# Patient Record
Sex: FEMALE | Race: WHITE | NOT HISPANIC OR LATINO | Employment: OTHER | URBAN - METROPOLITAN AREA
[De-identification: names, ages, dates, MRNs, and addresses within clinical notes are randomized per-mention and may not be internally consistent; named-entity substitution may affect disease eponyms.]

---

## 2023-04-24 DIAGNOSIS — E11.65 TYPE 2 DIABETES MELLITUS WITH HYPERGLYCEMIA, WITHOUT LONG-TERM CURRENT USE OF INSULIN (MULTI): Primary | ICD-10-CM

## 2023-04-24 RX ORDER — TIRZEPATIDE 5 MG/.5ML
2 INJECTION, SOLUTION SUBCUTANEOUS
COMMUNITY
Start: 2023-01-12 | End: 2023-04-24 | Stop reason: SDUPTHER

## 2023-04-24 RX ORDER — TIRZEPATIDE 5 MG/.5ML
0.5 INJECTION, SOLUTION SUBCUTANEOUS
Qty: 2 ML | Refills: 1 | Status: SHIPPED | OUTPATIENT
Start: 2023-04-24 | End: 2023-08-18 | Stop reason: ALTCHOICE

## 2023-05-09 LAB
ANION GAP IN SER/PLAS: 13 MMOL/L (ref 10–20)
CALCIUM (MG/DL) IN SER/PLAS: 9 MG/DL (ref 8.6–10.3)
CARBON DIOXIDE, TOTAL (MMOL/L) IN SER/PLAS: 27 MMOL/L (ref 21–32)
CHLORIDE (MMOL/L) IN SER/PLAS: 102 MMOL/L (ref 98–107)
CREATININE (MG/DL) IN SER/PLAS: 1.17 MG/DL (ref 0.5–1.05)
ESTIMATED AVERAGE GLUCOSE FOR HBA1C: 123 MG/DL
GFR FEMALE: 52 ML/MIN/1.73M2
GLUCOSE (MG/DL) IN SER/PLAS: 143 MG/DL (ref 74–99)
HEMOGLOBIN A1C/HEMOGLOBIN TOTAL IN BLOOD: 5.9 %
POTASSIUM (MMOL/L) IN SER/PLAS: 4.2 MMOL/L (ref 3.5–5.3)
SODIUM (MMOL/L) IN SER/PLAS: 138 MMOL/L (ref 136–145)
THYROTROPIN (MIU/L) IN SER/PLAS BY DETECTION LIMIT <= 0.05 MIU/L: 5.33 MIU/L (ref 0.44–3.98)
THYROXINE (T4) FREE (NG/DL) IN SER/PLAS: 0.74 NG/DL (ref 0.61–1.12)
UREA NITROGEN (MG/DL) IN SER/PLAS: 12 MG/DL (ref 6–23)

## 2023-05-10 PROBLEM — L65.9 HAIR LOSS: Status: ACTIVE | Noted: 2023-05-10

## 2023-05-10 PROBLEM — M54.2 CERVICAL PAIN: Status: ACTIVE | Noted: 2023-05-10

## 2023-05-10 PROBLEM — N17.9 AKI (ACUTE KIDNEY INJURY) (CMS-HCC): Status: ACTIVE | Noted: 2023-05-10

## 2023-05-10 PROBLEM — D72.819 LEUKOPENIA: Status: ACTIVE | Noted: 2023-05-10

## 2023-05-10 PROBLEM — F32.A DEPRESSION: Status: ACTIVE | Noted: 2023-05-10

## 2023-05-10 PROBLEM — M25.511 PAIN IN RIGHT SHOULDER: Status: ACTIVE | Noted: 2023-05-10

## 2023-05-10 PROBLEM — R80.9 PROTEINURIA: Status: ACTIVE | Noted: 2023-05-10

## 2023-05-10 PROBLEM — E66.812 CLASS 2 SEVERE OBESITY WITH BODY MASS INDEX (BMI) OF 35 TO 39.9 WITH SERIOUS COMORBIDITY: Status: ACTIVE | Noted: 2023-05-10

## 2023-05-10 PROBLEM — M25.559 ARTHRALGIA OF HIP: Status: ACTIVE | Noted: 2023-05-10

## 2023-05-10 PROBLEM — S16.1XXA CERVICAL STRAIN, INITIAL ENCOUNTER: Status: ACTIVE | Noted: 2023-05-10

## 2023-05-10 PROBLEM — R68.89 MULTIPLE SOMATIC COMPLAINTS: Status: ACTIVE | Noted: 2023-05-10

## 2023-05-10 PROBLEM — Z11.52 ENCOUNTER FOR SCREENING LABORATORY TESTING FOR COVID-19 VIRUS: Status: ACTIVE | Noted: 2023-05-10

## 2023-05-10 PROBLEM — E11.9 DIABETES TYPE 2, CONTROLLED (MULTI): Status: ACTIVE | Noted: 2023-05-10

## 2023-05-10 PROBLEM — N18.31 STAGE 3A CHRONIC KIDNEY DISEASE (MULTI): Status: ACTIVE | Noted: 2023-05-10

## 2023-05-10 PROBLEM — M26.629 TMJ PAIN DYSFUNCTION SYNDROME: Status: ACTIVE | Noted: 2023-05-10

## 2023-05-10 PROBLEM — I10 BENIGN ESSENTIAL HYPERTENSION: Status: ACTIVE | Noted: 2023-05-10

## 2023-05-10 PROBLEM — F41.9 ANXIETY DUE TO INVASIVE PROCEDURE: Status: ACTIVE | Noted: 2023-05-10

## 2023-05-10 PROBLEM — R41.89 BRAIN FOG: Status: ACTIVE | Noted: 2023-05-10

## 2023-05-10 PROBLEM — B35.4 TINEA CORPORIS: Status: ACTIVE | Noted: 2023-05-10

## 2023-05-10 PROBLEM — E66.01 CLASS 2 SEVERE OBESITY WITH BODY MASS INDEX (BMI) OF 35 TO 39.9 WITH SERIOUS COMORBIDITY (MULTI): Status: ACTIVE | Noted: 2023-05-10

## 2023-05-10 PROBLEM — M75.101 ROTATOR CUFF SYNDROME OF RIGHT SHOULDER: Status: ACTIVE | Noted: 2023-05-10

## 2023-05-10 PROBLEM — R79.89 ABNORMAL TSH: Status: ACTIVE | Noted: 2023-05-10

## 2023-05-10 PROBLEM — K76.0 FATTY LIVER: Status: ACTIVE | Noted: 2023-05-10

## 2023-05-10 PROBLEM — E53.8 B12 DEFICIENCY: Status: ACTIVE | Noted: 2023-05-10

## 2023-05-10 PROBLEM — E78.5 HYPERLIPIDEMIA, MILD: Status: ACTIVE | Noted: 2023-05-10

## 2023-05-11 ENCOUNTER — OFFICE VISIT (OUTPATIENT)
Dept: PRIMARY CARE | Facility: CLINIC | Age: 63
End: 2023-05-11
Payer: COMMERCIAL

## 2023-05-11 VITALS
HEIGHT: 65 IN | SYSTOLIC BLOOD PRESSURE: 130 MMHG | OXYGEN SATURATION: 95 % | BODY MASS INDEX: 42.35 KG/M2 | WEIGHT: 254.2 LBS | DIASTOLIC BLOOD PRESSURE: 88 MMHG | HEART RATE: 76 BPM | TEMPERATURE: 97.2 F | RESPIRATION RATE: 16 BRPM

## 2023-05-11 DIAGNOSIS — F32.5 MAJOR DEPRESSIVE DISORDER WITH SINGLE EPISODE, IN FULL REMISSION (CMS-HCC): ICD-10-CM

## 2023-05-11 DIAGNOSIS — E66.01 MORBID OBESITY (MULTI): ICD-10-CM

## 2023-05-11 DIAGNOSIS — E11.65 TYPE 2 DIABETES MELLITUS WITH HYPERGLYCEMIA, WITHOUT LONG-TERM CURRENT USE OF INSULIN (MULTI): ICD-10-CM

## 2023-05-11 DIAGNOSIS — E55.9 VITAMIN D DEFICIENCY: ICD-10-CM

## 2023-05-11 DIAGNOSIS — E11.65 CONTROLLED TYPE 2 DIABETES MELLITUS WITH HYPERGLYCEMIA, WITHOUT LONG-TERM CURRENT USE OF INSULIN (MULTI): Primary | ICD-10-CM

## 2023-05-11 DIAGNOSIS — E53.8 B12 DEFICIENCY: ICD-10-CM

## 2023-05-11 PROBLEM — R68.89 MULTIPLE SOMATIC COMPLAINTS: Status: RESOLVED | Noted: 2023-05-10 | Resolved: 2023-05-11

## 2023-05-11 PROBLEM — F41.9 ANXIETY DUE TO INVASIVE PROCEDURE: Status: RESOLVED | Noted: 2023-05-10 | Resolved: 2023-05-11

## 2023-05-11 PROBLEM — Z11.52 ENCOUNTER FOR SCREENING LABORATORY TESTING FOR COVID-19 VIRUS: Status: RESOLVED | Noted: 2023-05-10 | Resolved: 2023-05-11

## 2023-05-11 PROCEDURE — 99214 OFFICE O/P EST MOD 30 MIN: CPT | Performed by: INTERNAL MEDICINE

## 2023-05-11 PROCEDURE — 3044F HG A1C LEVEL LT 7.0%: CPT | Performed by: INTERNAL MEDICINE

## 2023-05-11 PROCEDURE — 3075F SYST BP GE 130 - 139MM HG: CPT | Performed by: INTERNAL MEDICINE

## 2023-05-11 PROCEDURE — 1036F TOBACCO NON-USER: CPT | Performed by: INTERNAL MEDICINE

## 2023-05-11 PROCEDURE — 3079F DIAST BP 80-89 MM HG: CPT | Performed by: INTERNAL MEDICINE

## 2023-05-11 RX ORDER — TIRZEPATIDE 5 MG/.5ML
0.5 INJECTION, SOLUTION SUBCUTANEOUS
Qty: 4 ML | Refills: 1 | Status: CANCELLED | OUTPATIENT
Start: 2023-05-11

## 2023-05-11 RX ORDER — CITALOPRAM HYDROBROMIDE 20 MG
30 TABLET ORAL DAILY
COMMUNITY

## 2023-05-11 RX ORDER — IBUPROFEN 200 MG
CAPSULE ORAL
COMMUNITY
Start: 2020-01-07

## 2023-05-11 RX ORDER — ROSUVASTATIN CALCIUM 10 MG/1
1 TABLET, COATED ORAL NIGHTLY
COMMUNITY
Start: 2020-06-10 | End: 2023-06-14 | Stop reason: SINTOL

## 2023-05-11 RX ORDER — TRAZODONE HYDROCHLORIDE 50 MG/1
50 TABLET ORAL NIGHTLY PRN
COMMUNITY
Start: 2023-01-12

## 2023-05-11 RX ORDER — TIRZEPATIDE 7.5 MG/.5ML
7.5 INJECTION, SOLUTION SUBCUTANEOUS
Qty: 6 ML | Refills: 1 | Status: SHIPPED | OUTPATIENT
Start: 2023-05-11 | End: 2023-08-18 | Stop reason: SDUPTHER

## 2023-05-11 RX ORDER — METHOCARBAMOL 500 MG/1
500 TABLET, FILM COATED ORAL 3 TIMES DAILY PRN
COMMUNITY
Start: 2023-01-13 | End: 2023-05-11 | Stop reason: ALTCHOICE

## 2023-05-11 RX ORDER — ACETAMINOPHEN, DIPHENHYDRAMINE HCL, PHENYLEPHRINE HCL 325; 25; 5 MG/1; MG/1; MG/1
5000 TABLET ORAL 2 TIMES WEEKLY
COMMUNITY
Start: 2022-10-13 | End: 2023-12-19 | Stop reason: ALTCHOICE

## 2023-05-11 RX ORDER — MELATONIN 10 MG
1 CAPSULE ORAL NIGHTLY
COMMUNITY
Start: 2022-07-07

## 2023-05-11 RX ORDER — BETAMETHASONE DIPROPIONATE 0.5 MG/G
CREAM TOPICAL
COMMUNITY
Start: 2023-04-05

## 2023-05-11 ASSESSMENT — ENCOUNTER SYMPTOMS
CHILLS: 0
SHORTNESS OF BREATH: 0
PALPITATIONS: 0
VOMITING: 0
WHEEZING: 0
HEADACHES: 0
BLOOD IN STOOL: 0
DYSPHORIC MOOD: 0
ABDOMINAL PAIN: 0
FREQUENCY: 0
DIARRHEA: 0
RHINORRHEA: 0
UNEXPECTED WEIGHT CHANGE: 0
POLYDIPSIA: 0
EYE PAIN: 0
POLYPHAGIA: 0
BACK PAIN: 1
ARTHRALGIAS: 1
DIZZINESS: 0
WOUND: 0
FEVER: 0
MYALGIAS: 0
CONSTIPATION: 0
NAUSEA: 0
COUGH: 0
NERVOUS/ANXIOUS: 0
CHEST TIGHTNESS: 0
DYSURIA: 0
SORE THROAT: 0
HEMATURIA: 0

## 2023-05-11 NOTE — PROGRESS NOTES
"Subjective   Patient ID: Darlin Ryan is a 63 y.o. female who presents for Follow-up (4 month follow up//Pt wants to know what she can take when needed that is stronger then tylenol but not bad foe her kidneys.).    HPI      Her for followup  Has been gaining weight   States diet is not the best  More sugar and eats a lot of sweets and candy  Struggles with her chronic back pain. Had injection in her back in 2016 and has not done much for it  Doing PT for her shoulder. Saw Dr. Fowler and plan for followup in 3 months.  She states has a pool to swim in but states hurts her back when swimming    She wonders what she can do besides tylenol since she cannot do ibuprofen, advil or aleve    She did her labs prior to her visit    Review of Systems   Constitutional:  Negative for chills, fever and unexpected weight change.   HENT:  Negative for congestion, hearing loss, rhinorrhea and sore throat.    Eyes:  Negative for pain and visual disturbance.   Respiratory:  Negative for cough, chest tightness, shortness of breath and wheezing.    Cardiovascular:  Negative for chest pain and palpitations.   Gastrointestinal:  Negative for abdominal pain, blood in stool, constipation, diarrhea, nausea and vomiting.   Endocrine: Negative for cold intolerance, heat intolerance, polydipsia and polyphagia.   Genitourinary:  Negative for dysuria, frequency and hematuria.   Musculoskeletal:  Positive for arthralgias and back pain. Negative for myalgias.   Skin:  Negative for rash and wound.   Neurological:  Negative for dizziness, syncope and headaches.   Psychiatric/Behavioral:  Negative for dysphoric mood. The patient is not nervous/anxious.        Objective   /88   Pulse 76   Temp 36.2 °C (97.2 °F)   Resp 16   Ht 1.651 m (5' 5\")   Wt 115 kg (254 lb 3.2 oz)   SpO2 95%   BMI 42.30 kg/m²     Physical Exam  Constitutional:       Appearance: Normal appearance.   Cardiovascular:      Rate and Rhythm: Normal rate and regular " rhythm.      Heart sounds: Normal heart sounds. No murmur heard.     No gallop.   Pulmonary:      Effort: Pulmonary effort is normal. No respiratory distress.      Breath sounds: Normal breath sounds.   Musculoskeletal:      Left lower leg: No edema.   Neurological:      Mental Status: She is alert.         Assessment/Plan   Problem List Items Addressed This Visit          Endocrine/Metabolic    B12 deficiency    Relevant Orders    Vitamin B12    Morbid obesity (CMS/[facility])    Diabetes type 2, controlled (CMS/[facility]) - Primary    Relevant Medications    tirzepatide (Mounjaro) 7.5 mg/0.5 mL pen injector       Other    Depression     Other Visit Diagnoses       Type 2 diabetes mellitus with hyperglycemia, without long-term current use of insulin (CMS/[facility])        Relevant Orders    CBC    Comprehensive Metabolic Panel    Lipid Panel    Albumin , Urine Random    Hemoglobin A1C    Vitamin D deficiency        Relevant Orders    Vitamin D 25-Hydroxy,Total (for eval of Vitamin D levels)             Obesity:  -topamax did not help  -Phentermine helped. Down 20 pounds.   --Increase Mounjaro  -did not have an affect with ozempic  -work on diet. Discussed strategies    TMJ issues:     New onset type 2 diabetes: a1c: 11.3--> 6.3--> 6.4--> 5.6-->6.1--> 5.3--> 5.4--> 5/8  stop metformin.   -increase mounjaro  -+ urine microalbumin (1/20)--> now normal 10/22    Migraines from metformin: off topamax    Hypertension: resolved off of meds  -Lisinopril caused migraines    Proteinuria: resolved    Leukopenia: resolved    Abnormal TSH: TSH barely up and free t4 ok    Hyperlipidemia: controlled on crestor.   -lipids WNL 10/22    Shoulder pain and neck pain: seeing ortho  -has a tear  1    Known Fatty liver: get Dr. Hunt records  -weight loss  -liver enzymes normalized    Depression  -follows with Dr. Hilda Lockwood ([place])--has not seen  -controlled on celexa and desyrel and prn vistaril    Chronic back pain: herniated disc, DDD, and  sciatica  -she has had injections  -can use salon past patches, voltaren gel  -advised we can redo imaging and potentially do xray and MRI in future  -try conservative therapy and try pool exercises. Discussed aquatic therapy-declined for now    Hx of lichen planus: follows with dermatology--Dr. Drake    Venous insufficiency: compression stockings, elevate legs    B12 deficiency: cont b12 pills     4 months f/u with labs before    Health Maintenance:   -pap smear: 10/20- neg, repeat 5 years  -Mammogram: refused mammo. Interested in MRI-ordered  -Colonoscopy: 10/26/22- cologuard neg  -Lung Cancer Screening: quit 2004  -Vaccinations: Pneumovax UTD. advised shingrix and tdap. Flu UTD  -DEXA: at 64

## 2023-06-02 ENCOUNTER — TELEPHONE (OUTPATIENT)
Dept: PRIMARY CARE | Facility: CLINIC | Age: 63
End: 2023-06-02
Payer: COMMERCIAL

## 2023-06-02 NOTE — TELEPHONE ENCOUNTER
Pt called because she stopped takin the crestor about two weeks ago.    She is wanting to know if there is something else she could try that would not effect her muscles so much.    Please advise.

## 2023-06-14 ENCOUNTER — OFFICE VISIT (OUTPATIENT)
Dept: PRIMARY CARE | Facility: CLINIC | Age: 63
End: 2023-06-14
Payer: COMMERCIAL

## 2023-06-14 VITALS
HEART RATE: 72 BPM | DIASTOLIC BLOOD PRESSURE: 63 MMHG | SYSTOLIC BLOOD PRESSURE: 126 MMHG | TEMPERATURE: 96.9 F | OXYGEN SATURATION: 98 %

## 2023-06-14 DIAGNOSIS — M48.061 SPINAL STENOSIS OF LUMBAR REGION, UNSPECIFIED WHETHER NEUROGENIC CLAUDICATION PRESENT: Primary | ICD-10-CM

## 2023-06-14 DIAGNOSIS — E78.5 HYPERLIPIDEMIA, MILD: ICD-10-CM

## 2023-06-14 DIAGNOSIS — M54.41 CHRONIC BILATERAL LOW BACK PAIN WITH RIGHT-SIDED SCIATICA: ICD-10-CM

## 2023-06-14 DIAGNOSIS — G89.29 CHRONIC BILATERAL LOW BACK PAIN WITH RIGHT-SIDED SCIATICA: ICD-10-CM

## 2023-06-14 PROCEDURE — 99214 OFFICE O/P EST MOD 30 MIN: CPT | Performed by: NURSE PRACTITIONER

## 2023-06-14 PROCEDURE — 3074F SYST BP LT 130 MM HG: CPT | Performed by: NURSE PRACTITIONER

## 2023-06-14 PROCEDURE — 1036F TOBACCO NON-USER: CPT | Performed by: NURSE PRACTITIONER

## 2023-06-14 PROCEDURE — 3044F HG A1C LEVEL LT 7.0%: CPT | Performed by: NURSE PRACTITIONER

## 2023-06-14 PROCEDURE — 3078F DIAST BP <80 MM HG: CPT | Performed by: NURSE PRACTITIONER

## 2023-06-14 RX ORDER — PRAVASTATIN SODIUM 40 MG/1
40 TABLET ORAL DAILY
Qty: 90 TABLET | Refills: 0 | Status: SHIPPED | OUTPATIENT
Start: 2023-06-14 | End: 2023-08-18 | Stop reason: ALTCHOICE

## 2023-06-14 RX ORDER — METHYLPREDNISOLONE 4 MG/1
TABLET ORAL
Qty: 21 TABLET | Refills: 0 | Status: SHIPPED | OUTPATIENT
Start: 2023-06-14 | End: 2023-06-21

## 2023-06-14 ASSESSMENT — PATIENT HEALTH QUESTIONNAIRE - PHQ9
SUM OF ALL RESPONSES TO PHQ9 QUESTIONS 1 AND 2: 0
1. LITTLE INTEREST OR PLEASURE IN DOING THINGS: NOT AT ALL
2. FEELING DOWN, DEPRESSED OR HOPELESS: NOT AT ALL

## 2023-06-14 NOTE — ASSESSMENT & PLAN NOTE
She would like to see spinal surgery for option regarding options for repair.  Has done therapy in the past.  Medrol pack given for acute flare up as she is leaving for vacation in a few days.  Discussed pain management to do another injection

## 2023-06-14 NOTE — PROGRESS NOTES
Subjective   Patient ID: Darlin Ryan is a 63 y.o. female who presents for discuss alternative for crestor.    Presents today with muscle pain which started on her lower legs.  Has been on crestor for about 4-5 years. Attributes this pain in the muscles to her crestor.  Pain in her legs has been present for many months.    Having issues with sciatica.  Did PT last fall.  She has herniated discs in the lower back along with arthritis. Had injections in the past.         Review of Systems  otherwise negative aside from what was mentioned above in HPI.    Objective   /63   Pulse 72   Temp 36.1 °C (96.9 °F)   SpO2 98%     Physical Exam  Constitutional:       Appearance: Normal appearance.   Cardiovascular:      Rate and Rhythm: Normal rate and regular rhythm.   Pulmonary:      Effort: Pulmonary effort is normal.      Breath sounds: Normal breath sounds.   Abdominal:      General: Abdomen is flat.   Musculoskeletal:         General: Normal range of motion.   Neurological:      General: No focal deficit present.      Mental Status: She is alert and oriented to person, place, and time. Mental status is at baseline.   Psychiatric:         Mood and Affect: Mood normal.         Behavior: Behavior normal.         Thought Content: Thought content normal.         Assessment/Plan   Problem List Items Addressed This Visit          Nervous    Lumbar spinal stenosis - Primary    Relevant Medications    methylPREDNISolone (Medrol Dospak) 4 mg tablets    Other Relevant Orders    Referral to Spine Surgery       Other    Hyperlipidemia, mild (Chronic)     Muscle aches on crestor  Switch to pravastatin which has lower side effects of myalgias. If persistent may send to cardiology to discuss injectables.         Relevant Medications    pravastatin (Pravachol) 40 mg tablet    Chronic low back pain with right-sided sciatica (Chronic)     She would like to see spinal surgery for option regarding options for repair.  Has done therapy  in the past.  Medrol pack given for acute flare up as she is leaving for vacation in a few days.  Discussed pain management to do another injection         Relevant Medications    methylPREDNISolone (Medrol Dospak) 4 mg tablets    Other Relevant Orders    Referral to Spine Surgery

## 2023-06-14 NOTE — ASSESSMENT & PLAN NOTE
Muscle aches on crestor  Switch to pravastatin which has lower side effects of myalgias. If persistent may send to cardiology to discuss injectables.

## 2023-06-27 ENCOUNTER — TELEPHONE (OUTPATIENT)
Dept: PRIMARY CARE | Facility: CLINIC | Age: 63
End: 2023-06-27
Payer: COMMERCIAL

## 2023-06-27 NOTE — TELEPHONE ENCOUNTER
Pt called to get message to Destiny    Pt stated she saw her for back pain, and she stated she couldn't reach Dr. Bhakta, orthopedic office. Didn't know who to contact.

## 2023-06-28 NOTE — TELEPHONE ENCOUNTER
Done. I called pt asked if Dr. Bhakta office had gotten back to her, she stated they hadn't. So I called Dr. Bhakta office, got central scheduling dept, who transferred me to her office, spoke with  and they gave me a phone number for her to call [phone]. But while I was on hold I researched and found referral that Destiny put in system and was able to scheduled appt with Dr. Bhakta on 7/6/23 @ 8:30 am thru [facility].  I called pt back and informed her that I booked appointment for her. Also mentioned she has an upcoming appointment with Dr. Fowler to review MRI. She stated she sees him for her shoulder.

## 2023-07-10 ENCOUNTER — TELEPHONE (OUTPATIENT)
Dept: PRIMARY CARE | Facility: CLINIC | Age: 63
End: 2023-07-10
Payer: COMMERCIAL

## 2023-07-10 NOTE — TELEPHONE ENCOUNTER
Pt was in last month and seen Destiny - Destiny had changed cholesterol medication to pravastatin due to leg pain - However pt states this medication seems to have same effect. Should she try something else? Also Dr Bhakta had prescribed generic robaxin, pt wants to confirm this is safe to take with her kidney diease - please call and advise.

## 2023-08-14 ASSESSMENT — ENCOUNTER SYMPTOMS
POLYPHAGIA: 0
SEIZURES: 0
NERVOUS/ANXIOUS: 0
WEIGHT LOSS: 0
TREMORS: 0
SWEATS: 0
CONFUSION: 0
POLYDIPSIA: 0
SPEECH DIFFICULTY: 0
VISUAL CHANGE: 0
HUNGER: 0
FATIGUE: 0
WEAKNESS: 0
BLACKOUTS: 0
DIZZINESS: 0
BLURRED VISION: 0
HEADACHES: 0

## 2023-08-15 ENCOUNTER — LAB (OUTPATIENT)
Dept: LAB | Facility: LAB | Age: 63
End: 2023-08-15
Payer: COMMERCIAL

## 2023-08-15 DIAGNOSIS — E55.9 VITAMIN D DEFICIENCY: ICD-10-CM

## 2023-08-15 DIAGNOSIS — E11.65 TYPE 2 DIABETES MELLITUS WITH HYPERGLYCEMIA, WITHOUT LONG-TERM CURRENT USE OF INSULIN (MULTI): ICD-10-CM

## 2023-08-15 DIAGNOSIS — E53.8 B12 DEFICIENCY: ICD-10-CM

## 2023-08-15 LAB
ALANINE AMINOTRANSFERASE (SGPT) (U/L) IN SER/PLAS: 13 U/L (ref 7–45)
ALBUMIN (G/DL) IN SER/PLAS: 3.9 G/DL (ref 3.4–5)
ALBUMIN (MG/L) IN URINE: 21.2 MG/L
ALBUMIN/CREATININE (UG/MG) IN URINE: 20.6 UG/MG CRT (ref 0–30)
ALKALINE PHOSPHATASE (U/L) IN SER/PLAS: 54 U/L (ref 33–136)
ANION GAP IN SER/PLAS: 13 MMOL/L (ref 10–20)
ASPARTATE AMINOTRANSFERASE (SGOT) (U/L) IN SER/PLAS: 17 U/L (ref 9–39)
BILIRUBIN TOTAL (MG/DL) IN SER/PLAS: 0.8 MG/DL (ref 0–1.2)
CALCIDIOL (25 OH VITAMIN D3) (NG/ML) IN SER/PLAS: 24 NG/ML
CALCIUM (MG/DL) IN SER/PLAS: 9.3 MG/DL (ref 8.6–10.3)
CARBON DIOXIDE, TOTAL (MMOL/L) IN SER/PLAS: 27 MMOL/L (ref 21–32)
CHLORIDE (MMOL/L) IN SER/PLAS: 103 MMOL/L (ref 98–107)
CHOLESTEROL (MG/DL) IN SER/PLAS: 221 MG/DL (ref 0–199)
CHOLESTEROL IN HDL (MG/DL) IN SER/PLAS: 35.5 MG/DL
CHOLESTEROL/HDL RATIO: 6.2
COBALAMIN (VITAMIN B12) (PG/ML) IN SER/PLAS: 532 PG/ML (ref 211–911)
CREATININE (MG/DL) IN SER/PLAS: 1.14 MG/DL (ref 0.5–1.05)
CREATININE (MG/DL) IN URINE: 103 MG/DL (ref 20–320)
ERYTHROCYTE DISTRIBUTION WIDTH (RATIO) BY AUTOMATED COUNT: 13 % (ref 11.5–14.5)
ERYTHROCYTE MEAN CORPUSCULAR HEMOGLOBIN CONCENTRATION (G/DL) BY AUTOMATED: 31.9 G/DL (ref 32–36)
ERYTHROCYTE MEAN CORPUSCULAR VOLUME (FL) BY AUTOMATED COUNT: 91 FL (ref 80–100)
ERYTHROCYTES (10*6/UL) IN BLOOD BY AUTOMATED COUNT: 4.66 X10E12/L (ref 4–5.2)
ESTIMATED AVERAGE GLUCOSE FOR HBA1C: 128 MG/DL
GFR FEMALE: 54 ML/MIN/1.73M2
GLUCOSE (MG/DL) IN SER/PLAS: 126 MG/DL (ref 74–99)
HEMATOCRIT (%) IN BLOOD BY AUTOMATED COUNT: 42.6 % (ref 36–46)
HEMOGLOBIN (G/DL) IN BLOOD: 13.6 G/DL (ref 12–16)
HEMOGLOBIN A1C/HEMOGLOBIN TOTAL IN BLOOD: 6.1 %
LDL: 140 MG/DL (ref 0–99)
LEUKOCYTES (10*3/UL) IN BLOOD BY AUTOMATED COUNT: 4 X10E9/L (ref 4.4–11.3)
NON HDL CHOLESTEROL: 186 MG/DL
PLATELETS (10*3/UL) IN BLOOD AUTOMATED COUNT: 174 X10E9/L (ref 150–450)
POTASSIUM (MMOL/L) IN SER/PLAS: 4.2 MMOL/L (ref 3.5–5.3)
PROTEIN TOTAL: 6.7 G/DL (ref 6.4–8.2)
SODIUM (MMOL/L) IN SER/PLAS: 139 MMOL/L (ref 136–145)
TRIGLYCERIDE (MG/DL) IN SER/PLAS: 229 MG/DL (ref 0–149)
UREA NITROGEN (MG/DL) IN SER/PLAS: 14 MG/DL (ref 6–23)
VLDL: 46 MG/DL (ref 0–40)

## 2023-08-15 PROCEDURE — 85027 COMPLETE CBC AUTOMATED: CPT

## 2023-08-15 PROCEDURE — 82607 VITAMIN B-12: CPT

## 2023-08-15 PROCEDURE — 82043 UR ALBUMIN QUANTITATIVE: CPT

## 2023-08-15 PROCEDURE — 36415 COLL VENOUS BLD VENIPUNCTURE: CPT

## 2023-08-15 PROCEDURE — 80061 LIPID PANEL: CPT

## 2023-08-15 PROCEDURE — 82570 ASSAY OF URINE CREATININE: CPT

## 2023-08-15 PROCEDURE — 82306 VITAMIN D 25 HYDROXY: CPT

## 2023-08-15 PROCEDURE — 80053 COMPREHEN METABOLIC PANEL: CPT

## 2023-08-15 PROCEDURE — 83036 HEMOGLOBIN GLYCOSYLATED A1C: CPT

## 2023-08-18 ENCOUNTER — OFFICE VISIT (OUTPATIENT)
Dept: PRIMARY CARE | Facility: CLINIC | Age: 63
End: 2023-08-18
Payer: COMMERCIAL

## 2023-08-18 VITALS
OXYGEN SATURATION: 97 % | BODY MASS INDEX: 42.33 KG/M2 | TEMPERATURE: 97.2 F | DIASTOLIC BLOOD PRESSURE: 84 MMHG | WEIGHT: 254.4 LBS | SYSTOLIC BLOOD PRESSURE: 122 MMHG | HEART RATE: 65 BPM | RESPIRATION RATE: 16 BRPM

## 2023-08-18 DIAGNOSIS — I10 BENIGN ESSENTIAL HYPERTENSION: Primary | ICD-10-CM

## 2023-08-18 DIAGNOSIS — E78.5 HYPERLIPIDEMIA, MILD: Chronic | ICD-10-CM

## 2023-08-18 DIAGNOSIS — E11.65 CONTROLLED TYPE 2 DIABETES MELLITUS WITH HYPERGLYCEMIA, WITHOUT LONG-TERM CURRENT USE OF INSULIN (MULTI): ICD-10-CM

## 2023-08-18 DIAGNOSIS — M48.061 SPINAL STENOSIS OF LUMBAR REGION, UNSPECIFIED WHETHER NEUROGENIC CLAUDICATION PRESENT: ICD-10-CM

## 2023-08-18 DIAGNOSIS — N18.31 STAGE 3A CHRONIC KIDNEY DISEASE (MULTI): ICD-10-CM

## 2023-08-18 PROBLEM — R80.9 PROTEINURIA: Status: RESOLVED | Noted: 2023-05-10 | Resolved: 2023-08-18

## 2023-08-18 PROBLEM — N17.9 AKI (ACUTE KIDNEY INJURY) (CMS-HCC): Status: RESOLVED | Noted: 2023-05-10 | Resolved: 2023-08-18

## 2023-08-18 PROCEDURE — 99214 OFFICE O/P EST MOD 30 MIN: CPT | Performed by: INTERNAL MEDICINE

## 2023-08-18 PROCEDURE — 1036F TOBACCO NON-USER: CPT | Performed by: INTERNAL MEDICINE

## 2023-08-18 PROCEDURE — 3079F DIAST BP 80-89 MM HG: CPT | Performed by: INTERNAL MEDICINE

## 2023-08-18 PROCEDURE — 3074F SYST BP LT 130 MM HG: CPT | Performed by: INTERNAL MEDICINE

## 2023-08-18 PROCEDURE — 3044F HG A1C LEVEL LT 7.0%: CPT | Performed by: INTERNAL MEDICINE

## 2023-08-18 RX ORDER — TIRZEPATIDE 7.5 MG/.5ML
7.5 INJECTION, SOLUTION SUBCUTANEOUS
Qty: 6 ML | Refills: 1 | Status: SHIPPED | OUTPATIENT
Start: 2023-08-18 | End: 2023-08-25 | Stop reason: SDUPTHER

## 2023-08-18 ASSESSMENT — ENCOUNTER SYMPTOMS
WEAKNESS: 0
POLYDIPSIA: 0
SWEATS: 0
ABDOMINAL PAIN: 0
CONSTIPATION: 0
HUNGER: 0
DIARRHEA: 0
BLURRED VISION: 0
DIZZINESS: 0
BACK PAIN: 1
ARTHRALGIAS: 1
VISUAL CHANGE: 0
POLYPHAGIA: 0
WEIGHT LOSS: 0
FATIGUE: 0
HEADACHES: 0
NERVOUS/ANXIOUS: 0
SEIZURES: 0
SPEECH DIFFICULTY: 0
BLACKOUTS: 0
CONFUSION: 0
TREMORS: 0
UNEXPECTED WEIGHT CHANGE: 1

## 2023-08-18 ASSESSMENT — PATIENT HEALTH QUESTIONNAIRE - PHQ9
1. LITTLE INTEREST OR PLEASURE IN DOING THINGS: NOT AT ALL
2. FEELING DOWN, DEPRESSED OR HOPELESS: NOT AT ALL
SUM OF ALL RESPONSES TO PHQ9 QUESTIONS 1 AND 2: 0

## 2023-08-18 NOTE — PROGRESS NOTES
Subjective   Patient ID: Darlin Ryan is a 63 y.o. female who presents for Follow-up (4 month follow up).    Diabetes  She has type 2 diabetes mellitus. No MedicAlert identification noted. The initial diagnosis of diabetes was made 3 years ago. Pertinent negatives for hypoglycemia include no confusion, dizziness, headaches, hunger, mood changes, nervousness/anxiousness, pallor, seizures, sleepiness, speech difficulty, sweats or tremors. Pertinent negatives for diabetes include no blurred vision, no chest pain, no fatigue, no foot paresthesias, no foot ulcerations, no polydipsia, no polyphagia, no polyuria, no visual change, no weakness and no weight loss. Pertinent negatives for hypoglycemia complications include no blackouts, no hospitalization, no nocturnal hypoglycemia, no required assistance and no required glucagon injection. Diabetic complications include nephropathy and retinopathy. Pertinent negatives for diabetic complications include no CVA, heart disease, impotence, peripheral neuropathy or PVD. Risk factors for coronary artery disease include dyslipidemia, obesity and stress. Current diabetic treatment includes oral agent (monotherapy). She is compliant with treatment most of the time. Her weight is stable. She is following a low fat/cholesterol diet. Meal planning includes avoidance of concentrated sweets. She has not had a previous visit with a dietitian. She rarely participates in exercise. She monitors blood glucose at home 1-2 x per week. Blood glucose monitoring compliance is inadequate. She does not see a podiatrist.Eye exam is current.        Here for followup  States will need PA startin 8/23 for mounjaro    She has been doing PT for sciatica and back. She may be getting injection soon for this. Has had severe muscle aches in legs. She stopped crestor and helped some. Was started on pravastatin and worsened it again and stopped. Has been off for a few weeks. Went to PT and today was the very  first day muscle aches in her legs felt better after PT    She gained weight. She states she was not able to do much  due to the severe pain. It made her feel very weak    She has 2 more sessions of PT and will want to do more    Review of Systems   Constitutional:  Positive for unexpected weight change. Negative for fatigue and weight loss.   Eyes:  Negative for blurred vision.   Cardiovascular:  Negative for chest pain.   Gastrointestinal:  Negative for abdominal pain, constipation and diarrhea.   Endocrine: Negative for polydipsia, polyphagia and polyuria.   Genitourinary:  Negative for impotence.   Musculoskeletal:  Positive for arthralgias and back pain.   Skin:  Negative for pallor.   Neurological:  Negative for dizziness, tremors, seizures, speech difficulty, weakness and headaches.   Psychiatric/Behavioral:  Negative for confusion. The patient is not nervous/anxious.        Objective   /84   Pulse 65   Temp 36.2 °C (97.2 °F)   Resp 16   Wt 115 kg (254 lb 6.4 oz)   SpO2 97%   BMI 42.33 kg/m²     Physical Exam  Constitutional:       Appearance: Normal appearance.   Cardiovascular:      Rate and Rhythm: Normal rate and regular rhythm.      Heart sounds: Normal heart sounds. No murmur heard.     No gallop.   Pulmonary:      Effort: Pulmonary effort is normal. No respiratory distress.      Breath sounds: Normal breath sounds.   Musculoskeletal:      Right lower leg: No edema.      Left lower leg: No edema.   Neurological:      Mental Status: She is alert.         Assessment/Plan   Problem List Items Addressed This Visit          Cardiac and Vasculature    Hyperlipidemia, mild (Chronic)    Benign essential hypertension - Primary       Endocrine/Metabolic    Diabetes type 2, controlled (CMS/HCC)    Relevant Medications    tirzepatide (Mounjaro) 7.5 mg/0.5 mL pen injector    Other Relevant Orders    Follow Up In [facility] - PCP - Established    Basic metabolic panel    Hemoglobin A1c        Genitourinary and Reproductive    Stage 3a chronic kidney disease (CMS/HCC)       Neuro    Lumbar spinal stenosis        Obesity:  -topamax did not help  -Phentermine helped. Down 20 pounds.   --On mounjaro  -did not have an affect with ozempic    Muscle aches: sounds like from statin  -give another 4 months off and consider lovastatin next visit, zetia or injections     TMJ issues:      New onset type 2 diabetes: a1c: 11.3--> 6.3--> 6.4--> 5.6-->6.1--> 5.3--> 5.4--> 5/8-->6.1  -off metformin  -on mounjaro  -+ urine microalbumin (1/20)--> now normal 10/22     Migraines from metformin: off topamax     Hypertension: resolved off of meds  -Lisinopril caused migraines     Proteinuria: resolved     Leukopenia: resolved     Abnormal TSH: TSH barely up and free t4 ok     Hyperlipidemia: uncontrolled  -crestor and pravastatin caused muscle aches     Shoulder pain and neck pain: seeing ortho  -has a tear  1    Known Fatty liver:   -weight loss  -liver enzymes normalized     Depression  -follows with Dr. Hilda Lockwood ([place])--has not seen  -controlled on celexa and desyrel and prn vistaril     Chronic back pain: herniated disc, DDD, and sciatica  -she has had injections  -can use salon past patches, voltaren gel  -advised we can redo imaging and potentially do xray and MRI in future  -try conservative therapy and try pool exercises. Discussed aquatic therapy-declined for now     Hx of lichen planus: follows with dermatology--Dr. Drake     Venous insufficiency: compression stockings, elevate legs     B12 deficiency: cont b12 pills      4 months f/u with labs before     Health Maintenance:   -pap smear: 10/20- neg, repeat 5 years  -Mammogram: refused mammo. Interested in MRI-ordered  -Colonoscopy: 10/26/22- cologuard neg  -Lung Cancer Screening: quit 2004  -Vaccinations: Pneumovax UTD. advised shingrix and tdap. Flu UTD  -DEXA: at 64

## 2023-08-25 DIAGNOSIS — E11.65 CONTROLLED TYPE 2 DIABETES MELLITUS WITH HYPERGLYCEMIA, WITHOUT LONG-TERM CURRENT USE OF INSULIN (MULTI): ICD-10-CM

## 2023-08-25 RX ORDER — TIRZEPATIDE 7.5 MG/.5ML
7.5 INJECTION, SOLUTION SUBCUTANEOUS
Qty: 12 ML | Refills: 1 | Status: SHIPPED | OUTPATIENT
Start: 2023-08-25 | End: 2023-10-20 | Stop reason: SDUPTHER

## 2023-08-25 NOTE — TELEPHONE ENCOUNTER
Pt called and states that at her visit it was discussed that her [facility] could [facility] but they are out of stock.    She is not sure what to do from here.    Please advise.

## 2023-09-11 ENCOUNTER — TELEPHONE (OUTPATIENT)
Dept: PRIMARY CARE | Facility: CLINIC | Age: 63
End: 2023-09-11
Payer: COMMERCIAL

## 2023-09-11 NOTE — TELEPHONE ENCOUNTER
"Pt called and states that \"something popped\" in her lower stomach.    She thinks it was a hernia.    She is not sure what can be done about it, it is quite painful.    Please advise.  "

## 2023-09-12 ENCOUNTER — OFFICE VISIT (OUTPATIENT)
Dept: PRIMARY CARE | Facility: CLINIC | Age: 63
End: 2023-09-12
Payer: COMMERCIAL

## 2023-09-12 VITALS
DIASTOLIC BLOOD PRESSURE: 70 MMHG | WEIGHT: 258.4 LBS | BODY MASS INDEX: 43 KG/M2 | OXYGEN SATURATION: 97 % | RESPIRATION RATE: 16 BRPM | TEMPERATURE: 97.2 F | SYSTOLIC BLOOD PRESSURE: 138 MMHG | HEART RATE: 71 BPM

## 2023-09-12 DIAGNOSIS — R10.32 LLQ PAIN: Primary | ICD-10-CM

## 2023-09-12 PROCEDURE — 99213 OFFICE O/P EST LOW 20 MIN: CPT | Performed by: INTERNAL MEDICINE

## 2023-09-12 PROCEDURE — 1036F TOBACCO NON-USER: CPT | Performed by: INTERNAL MEDICINE

## 2023-09-12 PROCEDURE — 3075F SYST BP GE 130 - 139MM HG: CPT | Performed by: INTERNAL MEDICINE

## 2023-09-12 PROCEDURE — 3078F DIAST BP <80 MM HG: CPT | Performed by: INTERNAL MEDICINE

## 2023-09-12 PROCEDURE — 3044F HG A1C LEVEL LT 7.0%: CPT | Performed by: INTERNAL MEDICINE

## 2023-09-12 ASSESSMENT — PATIENT HEALTH QUESTIONNAIRE - PHQ9
SUM OF ALL RESPONSES TO PHQ9 QUESTIONS 1 AND 2: 0
2. FEELING DOWN, DEPRESSED OR HOPELESS: NOT AT ALL
1. LITTLE INTEREST OR PLEASURE IN DOING THINGS: NOT AT ALL

## 2023-09-12 NOTE — PROGRESS NOTES
Subjective   Patient ID: Darlin Ryan is a 63 y.o. female who presents for Abdominal Pain (Lower left), Hernia (Possibly?), and Depression (Pt is feeling down. She didn't want to use her stomach muscles because the the current hernia so she was using her elbows to get up, then her shoulder stared hurting from using her elbows so she used her stomach muscles again and then this happened.).    HPI     She felt a pop Thursday night. Used abdominal muslces to get up and felt pop in LLQ and was very severe on left side. Tried hating pad. Thought muscle  Worsens with walking, movement and even sitting. Feels crampy. Feels better today  Has a hernia alreadu and feels hte same  She feels down about this    Review of Systems   All other systems reviewed and are negative.      Objective   /70   Pulse 71   Temp 36.2 °C (97.2 °F)   Resp 16   Wt 117 kg (258 lb 6.4 oz)   SpO2 97%   BMI 43.00 kg/m²     Physical Exam  Abdominal:      General: There is no distension.      Palpations: Abdomen is soft. There is no mass.      Tenderness: There is abdominal tenderness (left lower quadrant/inguinal area).         Assessment/Plan   Problem List Items Addressed This Visit    None  Visit Diagnoses       LLQ pain    -  Primary          LLQ pain--appears to be hernia based on history  -unable to feel  -much better  -abdominal compression with spanx/ abdominal binder  -discussed CT A/P to diagnose. Wants to wait  -will call if wishes to proceed and will order

## 2023-09-29 ENCOUNTER — APPOINTMENT (OUTPATIENT)
Dept: PRIMARY CARE | Facility: CLINIC | Age: 63
End: 2023-09-29
Payer: COMMERCIAL

## 2023-10-12 ENCOUNTER — OFFICE VISIT (OUTPATIENT)
Dept: ORTHOPEDIC SURGERY | Facility: CLINIC | Age: 63
End: 2023-10-12
Payer: COMMERCIAL

## 2023-10-12 DIAGNOSIS — M54.16 LUMBAR RADICULITIS: Primary | ICD-10-CM

## 2023-10-12 DIAGNOSIS — M43.16 SPONDYLOLISTHESIS OF LUMBAR REGION: ICD-10-CM

## 2023-10-12 DIAGNOSIS — M47.816 LUMBAR SPONDYLOSIS: ICD-10-CM

## 2023-10-12 DIAGNOSIS — M62.830 MUSCLE SPASM OF BACK: ICD-10-CM

## 2023-10-12 PROCEDURE — 99204 OFFICE O/P NEW MOD 45 MIN: CPT | Performed by: PHYSICAL MEDICINE & REHABILITATION

## 2023-10-12 PROCEDURE — 3044F HG A1C LEVEL LT 7.0%: CPT | Performed by: PHYSICAL MEDICINE & REHABILITATION

## 2023-10-12 PROCEDURE — 1036F TOBACCO NON-USER: CPT | Performed by: PHYSICAL MEDICINE & REHABILITATION

## 2023-10-12 PROCEDURE — 99214 OFFICE O/P EST MOD 30 MIN: CPT | Performed by: PHYSICAL MEDICINE & REHABILITATION

## 2023-10-12 RX ORDER — GABAPENTIN 300 MG/1
300 CAPSULE ORAL 3 TIMES DAILY
Qty: 90 CAPSULE | Refills: 2 | Status: SHIPPED | OUTPATIENT
Start: 2023-10-12 | End: 2023-11-13 | Stop reason: SDUPTHER

## 2023-10-12 RX ORDER — METHOCARBAMOL 500 MG/1
TABLET, FILM COATED ORAL
Qty: 60 TABLET | Refills: 1 | Status: SHIPPED | OUTPATIENT
Start: 2023-10-12 | End: 2024-04-11 | Stop reason: SDUPTHER

## 2023-10-12 NOTE — LETTER
October 12, 2023     Teri Odonnell MD  [address]  [facility] [place]  [facility] [address]    Patient: Darlin Ryan   YOB: 1960   Date of Visit: 10/12/2023       Dear Dr. Teri Odonnell MD:    Thank you for referring Darlin Ryan to me for evaluation. Below are my notes for this consultation.  If you have questions, please do not hesitate to call me. I look forward to following your patient along with you.       Sincerely,     Elías Hinds MD      CC: No Recipients  ______________________________________________________________________________________    10/12/2023     Assessment: Very pleasant 63-year-old female with worsening chronic lower back and predominantly right leg pain.  Symptoms have been intermittently severe and impact her daily activity.  She is not able to ambulate or stand for any length of time.  She has known spondylolisthesis with pars fracture at L5-S1.  This appears to be grade 2 with mild dynamic instability on flexion-extension films.  -Right lumbar radiculitis-currently suspect L5 and/or S1  -Lumbar spondylosis with known grade 2 anterolisthesis of L5-S1 and pars fracture  -HX of CKD and DMII - Last A1C under 8  -Overweight-advised on weight loss    PLAN:  1)  Imaging/Diagnostic Studies: Extensively reviewed Dr. Bhakta's most recent note as well as last lumbar x-ray.  Will obtain lumbar MRI to further assess given her persistent pain despite over 6 weeks of conservative treatment.  Has been over 2 years since her last MRI  2)  Therapy/Rehabilitation: She has completed 6 weeks of physical therapy recently.  Advised that she should continue her home exercise program  3)  Pharmacological Management: New Rx provided for gabapentin-advised to titrate slowly potentially up to 600 mg 3 times daily.  Robaxin as needed also provided.  Advised on over-the-counter Tylenol Lidoderm patch usage.  4)  Spine/Surgical Interventions: May benefit from lumbar ZAKIA pending MRI.   She understands seek further surgical care if she has any worsening neurological deficits  5)  Alternative Treatments: May consider alternative treatment options in the future including manipulation (chiropractor versus osteopathic) and/or acupuncture if patient does not obtain optimal relief with initial treatment plan.  6)  Consultations:  None at this time  7)  Follow -up: 4-6 weeks or PRN if symptoms worsen/do not improve.   8)  Future treatment considerations: Lumbar ZAKIA and/or further titration of gabapentin    Patient advised of the difference between hurt and harm and advised to continue with all normal activities and exercises. Patient verbalized understanding of the above plan and was happy with the care provided.      The above clinical summary has been dictated with voice recognition software. It has not been proofread for grammatical errors, typographical mistakes, or other semantic inconsistencies.    Thank you for visiting our office today. It was our pleasure to take part in your healthcare.     Do not hesitate to call with any questions regarding your plan of care after leaving at [phone]    To clinicians, thank you very much for this kind referral. It is a privilege to partner with you in the care of your patients. My office would be delighted to assist you with any further consultations or with questions regarding the plan of care outlined. Do not hesitate to call the office or contact me directly.     Sincerely,    ESE Hinds MD  , Physical Medicine and Rehabilitation, Orthopedic Spine  [facility]  [facility] [place]         Darlin Ryan is a 63 y.o. female who presents with chronic lower back pain - present for about 2007  Location:  Main area of pain is at the right buttock  Radiation:  intermittently down the right leg, posterior thigh to knee.  Also with some paresthesia.  No symptoms past the  "knee  Quality:  \"nerve pain\", deep ache    current 0/10,  at its worst 9/10  Exacerbated by walking, standing  Relieved by sitting or laying down   Onset, traumatic event:  no recent.  Notes a fall in 2016 with exacerbation of pain.    Has tried:  PT and injection in 2016 with relief.      Valsalva sign is pos  Grocery cart sign is neg  Smoker: no  Does not wake them at night  Litigation: none     Patient denies bowel/bladder incontinence, denies fever, denies unintentional weight loss, denies clumsiness of hands, feet, or dropping things.  Denies any constitutional or myelopathic symptomatology.      PREVIOUS TREATMENTS  IN THE LAST SIX MONTHS     Active conservative therapy  in the last six months (see below)              1. Physical therapy:  yes, August 17th - completed over 6 weeks.                                                                                  2. Home exercise program after PT: yes                                                  3. A physician supervised home exercise program (HEP):  yes               4. Chiropractic Care: no                                                                   Passive conservative therapy  in the last six months (see below)              1.NSAIDS:  avoiding due to CKD                                                                                                         2. Prescription pain medication:   none                                                           3. Acupuncture:   no                                                                                          4. Tens unit: no     Assistive Devices:  none    Work status: Retired      ROS: Other than listed in HPI, PMHX below, and intake paperwork including a 30 point patient-recorded review of symptoms which was personally reviewed and inclusive of no history of unintentional weight loss, change in appetite, significant malaise, fevers, chills, or change in bowel/bladder, shortness of breath, or " chest pain.    I have confirmed and edited as necessary Past Medical, Past Surgical, Family, Social History and ROS as obtained by others. These were also obtained on new patient forms.      PHYSICAL EXAM:   GENERAL APPEARANCE:  Well nourished, well developed, and no apparent distress.  NEURO PSYCH: Patient oriented to person, place, Mood pleasant. Benign affect.  MUSCULOSKELETAL and NEUROLOGICAL       VISUAL INSPECTION           LUMBAR: WNL  SPINE ROM:   LUMBAR ROM: Limited endrange flexion with reproduction of pain      PALPATION:           SPINOUS PROCESS: Nontender midline           PARASPINALS: Mild tenderness right lower lumbar  FACET LOADING: Positive right lower lumbar  MUSCLE BULK: Normal and symmetrical in the upper & lower extremities.  MUSCLE TONE: Normal  MOTOR: 5/5 in all muscle groups tested in bilateral lower extremities   SENSORY: Normal sensory exam to light touch to the lower extremities  GAIT: Mildly antalgic.  Able to go up and heels and toes with no sig. weakness.  No sig. balance deficit appreciated  REFLEXES: +2 to bilateral L extremities  LONG TRACT SIGNS: No clonus, Neg Hoffmans.  Slump testing: Positive on the right    DATA REVIEW:   The below imaging studies were personally reviewed and discussed with the patient.    Medical Decision Making:  The above note constitutes a Moderate to High level of medical decision making based on past data and imaging review, new and chronic symptoms with exacerbation, change in weakness or sensation, new imaging and diagnostic studies ordered, discussion of potential interventional or surgical treatment options, acute or chronic pain that may pose a threat to bodily function.    Past Medical History:   Diagnosis Date   • Proteinuria 05/10/2023       Medication Documentation Review Audit       Reviewed by Teri Odonnell MD (Physician) on 09/12/23 at 1427      Medication Order Taking? Sig Documenting Provider Last Dose Status   betamethasone dipropionate  0.05 % cream [id number] Yes g: Apply once a day x2 weeks, stop for one week then repeat as needed Historical Provider, MD Taking Active   blood sugar diagnostic (Blood Glucose Test) strip [id number] Yes TEST GLUCOSE ONCE DAILY AS DIRECTED FOR DIABETES Historical Provider, MD Taking Active   CeleXA 20 mg tablet [id number] Yes Take 1.5 tablets (30 mg) by mouth once daily. Historical Provider, MD Taking Active   cyanocobalamin, vitamin B-12, 5,000 mcg tablet, sublingual [id number] Yes Place 5,000 mcg under the tongue 2 times a week. Historical Provider, MD Taking Active   melatonin 10 mg capsule [id number] Yes Take 1 tablet by mouth once daily at bedtime. Historical Provider, MD Taking Active   tirzepatide (Mounjaro) 7.5 mg/0.5 mL pen injector [id number] Yes Inject 7.5 mg under the skin 1 (one) time per week. Teri Odonnell MD Taking Active   traZODone (Desyrel) 50 mg tablet [id number] Yes Take 1 tablet (50 mg) by mouth as needed at bedtime for sleep. Historical Provider, MD Taking Active                    Allergies   Allergen Reactions   • Crestor [Rosuvastatin] Other     Muscle cramps   • Pravastatin Other     Sever muscles cramps.       Social History     Socioeconomic History   • Marital status:      Spouse name: Not on file   • Number of children: Not on file   • Years of education: Not on file   • Highest education level: Not on file   Occupational History   • Not on file   Tobacco Use   • Smoking status: Former     Types: Cigarettes     Start date:      Quit date: 2004     Years since quittin.7   • Smokeless tobacco: Never   Vaping Use   • Vaping Use: Never used   Substance and Sexual Activity   • Alcohol use: Not Currently   • Drug use: Never   • Sexual activity: Not on file   Other Topics Concern   • Not on file   Social History Narrative   • Not on file     Social Determinants of Health     Financial Resource Strain: Not on file   Food Insecurity: Not on file   Transportation Needs: Not on file   Physical  Activity: Not on file   Stress: Not on file   Social Connections: Not on file   Intimate Partner Violence: Not on file   Housing Stability: Not on file       Past Surgical History:   Procedure Laterality Date   •  SECTION, LOW TRANSVERSE     • OTHER SURGICAL HISTORY  2020    Dilation and curettage   • OTHER SURGICAL HISTORY  2020    Colonoscopy

## 2023-10-12 NOTE — PROGRESS NOTES
10/12/2023     Assessment: Very pleasant 63-year-old female with worsening chronic lower back and predominantly right leg pain.  Symptoms have been intermittently severe and impact her daily activity.  She is not able to ambulate or stand for any length of time.  She has known spondylolisthesis with pars fracture at L5-S1.  This appears to be grade 2 with mild dynamic instability on flexion-extension films.  -Right lumbar radiculitis-currently suspect L5 and/or S1  -Lumbar spondylosis with known grade 2 anterolisthesis of L5-S1 and pars fracture  -HX of CKD and DMII - Last A1C under 8  -Overweight-advised on weight loss    PLAN:  1)  Imaging/Diagnostic Studies: Extensively reviewed Dr. Bhakta's most recent note as well as last lumbar x-ray.  Will obtain lumbar MRI to further assess given her persistent pain despite over 6 weeks of conservative treatment.  Has been over 2 years since her last MRI  2)  Therapy/Rehabilitation: She has completed 6 weeks of physical therapy recently.  Advised that she should continue her home exercise program  3)  Pharmacological Management: New Rx provided for gabapentin-advised to titrate slowly potentially up to 600 mg 3 times daily.  Robaxin as needed also provided.  Advised on over-the-counter Tylenol Lidoderm patch usage.  4)  Spine/Surgical Interventions: May benefit from lumbar ZAKIA pending MRI.  She understands seek further surgical care if she has any worsening neurological deficits  5)  Alternative Treatments: May consider alternative treatment options in the future including manipulation (chiropractor versus osteopathic) and/or acupuncture if patient does not obtain optimal relief with initial treatment plan.  6)  Consultations:  None at this time  7)  Follow -up: 4-6 weeks or PRN if symptoms worsen/do not improve.   8)  Future treatment considerations: Lumbar ZAKIA and/or further titration of gabapentin    Patient advised of the difference between hurt and harm and advised to  "continue with all normal activities and exercises. Patient verbalized understanding of the above plan and was happy with the care provided.      The above clinical summary has been dictated with voice recognition software. It has not been proofread for grammatical errors, typographical mistakes, or other semantic inconsistencies.    Thank you for visiting our office today. It was our pleasure to take part in your healthcare.     Do not hesitate to call with any questions regarding your plan of care after leaving at [phone]    To clinicians, thank you very much for this kind referral. It is a privilege to partner with you in the care of your patients. My office would be delighted to assist you with any further consultations or with questions regarding the plan of care outlined. Do not hesitate to call the office or contact me directly.     Sincerely,    ESE Hinds MD  , Physical Medicine and Rehabilitation, Orthopedic Spine  [facility]  [facility] [place]         Darlin Ryan is a 63 y.o. female who presents with chronic lower back pain - present for about 2007  Location:  Main area of pain is at the right buttock  Radiation:  intermittently down the right leg, posterior thigh to knee.  Also with some paresthesia.  No symptoms past the knee  Quality:  \"nerve pain\", deep ache    current 0/10,  at its worst 9/10  Exacerbated by walking, standing  Relieved by sitting or laying down   Onset, traumatic event:  no recent.  Notes a fall in 2016 with exacerbation of pain.    Has tried:  PT and injection in 2016 with relief.      Valsalva sign is pos  Grocery cart sign is neg  Smoker: no  Does not wake them at night  Litigation: none     Patient denies bowel/bladder incontinence, denies fever, denies unintentional weight loss, denies clumsiness of hands, feet, or dropping things.  Denies any constitutional or myelopathic symptomatology. "      PREVIOUS TREATMENTS  IN THE LAST SIX MONTHS     Active conservative therapy  in the last six months (see below)              1. Physical therapy:  yes, August 17th - completed over 6 weeks.                                                                                  2. Home exercise program after PT: yes                                                  3. A physician supervised home exercise program (HEP):  yes               4. Chiropractic Care: no                                                                   Passive conservative therapy  in the last six months (see below)              1.NSAIDS:  avoiding due to CKD                                                                                                         2. Prescription pain medication:   none                                                           3. Acupuncture:   no                                                                                          4. Tens unit: no     Assistive Devices:  none    Work status: Retired      ROS: Other than listed in HPI, PMHX below, and intake paperwork including a 30 point patient-recorded review of symptoms which was personally reviewed and inclusive of no history of unintentional weight loss, change in appetite, significant malaise, fevers, chills, or change in bowel/bladder, shortness of breath, or chest pain.    I have confirmed and edited as necessary Past Medical, Past Surgical, Family, Social History and ROS as obtained by others. These were also obtained on new patient forms.      PHYSICAL EXAM:   GENERAL APPEARANCE:  Well nourished, well developed, and no apparent distress.  NEURO PSYCH: Patient oriented to person, place, Mood pleasant. Benign affect.  MUSCULOSKELETAL and NEUROLOGICAL       VISUAL INSPECTION           LUMBAR: WNL  SPINE ROM:   LUMBAR ROM: Limited endrange flexion with reproduction of pain      PALPATION:           SPINOUS PROCESS: Nontender midline           PARASPINALS:  Mild tenderness right lower lumbar  FACET LOADING: Positive right lower lumbar  MUSCLE BULK: Normal and symmetrical in the upper & lower extremities.  MUSCLE TONE: Normal  MOTOR: 5/5 in all muscle groups tested in bilateral lower extremities   SENSORY: Normal sensory exam to light touch to the lower extremities  GAIT: Mildly antalgic.  Able to go up and heels and toes with no sig. weakness.  No sig. balance deficit appreciated  REFLEXES: +2 to bilateral L extremities  LONG TRACT SIGNS: No clonus, Neg Hoffmans.  Slump testing: Positive on the right    DATA REVIEW:   The below imaging studies were personally reviewed and discussed with the patient.    Medical Decision Making:  The above note constitutes a Moderate to High level of medical decision making based on past data and imaging review, new and chronic symptoms with exacerbation, change in weakness or sensation, new imaging and diagnostic studies ordered, discussion of potential interventional or surgical treatment options, acute or chronic pain that may pose a threat to bodily function.    Past Medical History:   Diagnosis Date    Proteinuria 05/10/2023       Medication Documentation Review Audit       Reviewed by Teri Odonnell MD (Physician) on 09/12/23 at 1427      Medication Order Taking? Sig Documenting Provider Last Dose Status   betamethasone dipropionate 0.05 % cream [id number] Yes g: Apply once a day x2 weeks, stop for one week then repeat as needed Historical Provider, MD Taking Active   blood sugar diagnostic (Blood Glucose Test) strip [id number] Yes TEST GLUCOSE ONCE DAILY AS DIRECTED FOR DIABETES Historical Provider, MD Taking Active   CeleXA 20 mg tablet [id number] Yes Take 1.5 tablets (30 mg) by mouth once daily. Historical Provider, MD Taking Active   cyanocobalamin, vitamin B-12, 5,000 mcg tablet, sublingual [id number] Yes Place 5,000 mcg under the tongue 2 times a week. Historical Provider, MD Taking Active   melatonin 10 mg capsule [id number] Yes Take 1  tablet by mouth once daily at bedtime. Historical Provider, MD Taking Active   tirzepatide (Mounjaro) 7.5 mg/0.5 mL pen injector [id number] Yes Inject 7.5 mg under the skin 1 (one) time per week. Teri Odonnell MD Taking Active   traZODone (Desyrel) 50 mg tablet [id number] Yes Take 1 tablet (50 mg) by mouth as needed at bedtime for sleep. Historical Provider, MD Taking Active                    Allergies   Allergen Reactions    Crestor [Rosuvastatin] Other     Muscle cramps    Pravastatin Other     Sever muscles cramps.       Social History     Socioeconomic History    Marital status:      Spouse name: Not on file    Number of children: Not on file    Years of education: Not on file    Highest education level: Not on file   Occupational History    Not on file   Tobacco Use    Smoking status: Former     Types: Cigarettes     Start date:      Quit date:      Years since quittin.7    Smokeless tobacco: Never   Vaping Use    Vaping Use: Never used   Substance and Sexual Activity    Alcohol use: Not Currently    Drug use: Never    Sexual activity: Not on file   Other Topics Concern    Not on file   Social History Narrative    Not on file     Social Determinants of Health     Financial Resource Strain: Not on file   Food Insecurity: Not on file   Transportation Needs: Not on file   Physical Activity: Not on file   Stress: Not on file   Social Connections: Not on file   Intimate Partner Violence: Not on file   Housing Stability: Not on file       Past Surgical History:   Procedure Laterality Date     SECTION, LOW TRANSVERSE      OTHER SURGICAL HISTORY  2020    Dilation and curettage    OTHER SURGICAL HISTORY  2020    Colonoscopy

## 2023-10-20 DIAGNOSIS — E78.5 HYPERLIPIDEMIA, MILD: Primary | Chronic | ICD-10-CM

## 2023-10-20 DIAGNOSIS — E11.65 CONTROLLED TYPE 2 DIABETES MELLITUS WITH HYPERGLYCEMIA, WITHOUT LONG-TERM CURRENT USE OF INSULIN (MULTI): ICD-10-CM

## 2023-10-20 RX ORDER — TIRZEPATIDE 7.5 MG/.5ML
7.5 INJECTION, SOLUTION SUBCUTANEOUS
Qty: 12 ML | Refills: 1 | Status: SHIPPED | OUTPATIENT
Start: 2023-10-20 | End: 2023-12-19 | Stop reason: ALTCHOICE

## 2023-10-20 RX ORDER — ROSUVASTATIN CALCIUM 10 MG/1
10 TABLET, COATED ORAL NIGHTLY
Qty: 90 TABLET | Refills: 3 | Status: SHIPPED | OUTPATIENT
Start: 2023-10-20 | End: 2023-12-08 | Stop reason: ALTCHOICE

## 2023-10-25 ENCOUNTER — ANCILLARY PROCEDURE (OUTPATIENT)
Dept: RADIOLOGY | Facility: CLINIC | Age: 63
End: 2023-10-25
Payer: COMMERCIAL

## 2023-10-25 DIAGNOSIS — M62.830 MUSCLE SPASM OF BACK: ICD-10-CM

## 2023-10-25 DIAGNOSIS — M47.816 LUMBAR SPONDYLOSIS: ICD-10-CM

## 2023-10-25 DIAGNOSIS — M54.16 LUMBAR RADICULITIS: ICD-10-CM

## 2023-10-25 DIAGNOSIS — M43.16 SPONDYLOLISTHESIS OF LUMBAR REGION: ICD-10-CM

## 2023-10-25 PROCEDURE — 72148 MRI LUMBAR SPINE W/O DYE: CPT

## 2023-10-25 PROCEDURE — 72148 MRI LUMBAR SPINE W/O DYE: CPT | Performed by: RADIOLOGY

## 2023-10-26 ENCOUNTER — TELEPHONE (OUTPATIENT)
Dept: ORTHOPEDIC SURGERY | Facility: CLINIC | Age: 63
End: 2023-10-26
Payer: COMMERCIAL

## 2023-10-26 NOTE — TELEPHONE ENCOUNTER
Had her MRI yesterday. Afterward she went to the store and had another episode  where she bent down to get something and she had a sharp lower back pain and right leg gave out, she said you know about this? And she had a hard time getting back up and walking. She had to bend over again 5 minutes later and it helped it go away.     She wanted MRI results.

## 2023-10-31 ENCOUNTER — TELEPHONE (OUTPATIENT)
Dept: ORTHOPEDIC SURGERY | Facility: CLINIC | Age: 63
End: 2023-10-31
Payer: COMMERCIAL

## 2023-10-31 NOTE — TELEPHONE ENCOUNTER
We review the MRI and discuss results at follow-up.  I recall her pain being pretty severe so we can make a virtual visit at her earliest convenience if she would like, or in person.  We have a lot of openings due to our schedule not being open to central scheduling.

## 2023-11-13 ENCOUNTER — OFFICE VISIT (OUTPATIENT)
Dept: ORTHOPEDIC SURGERY | Facility: CLINIC | Age: 63
End: 2023-11-13
Payer: COMMERCIAL

## 2023-11-13 DIAGNOSIS — M43.16 SPONDYLOLISTHESIS OF LUMBAR REGION: ICD-10-CM

## 2023-11-13 DIAGNOSIS — M54.16 LUMBAR RADICULITIS: Primary | ICD-10-CM

## 2023-11-13 PROCEDURE — 1036F TOBACCO NON-USER: CPT | Performed by: STUDENT IN AN ORGANIZED HEALTH CARE EDUCATION/TRAINING PROGRAM

## 2023-11-13 PROCEDURE — 3075F SYST BP GE 130 - 139MM HG: CPT | Performed by: STUDENT IN AN ORGANIZED HEALTH CARE EDUCATION/TRAINING PROGRAM

## 2023-11-13 PROCEDURE — 3044F HG A1C LEVEL LT 7.0%: CPT | Performed by: STUDENT IN AN ORGANIZED HEALTH CARE EDUCATION/TRAINING PROGRAM

## 2023-11-13 PROCEDURE — 3078F DIAST BP <80 MM HG: CPT | Performed by: STUDENT IN AN ORGANIZED HEALTH CARE EDUCATION/TRAINING PROGRAM

## 2023-11-13 PROCEDURE — 99213 OFFICE O/P EST LOW 20 MIN: CPT | Performed by: STUDENT IN AN ORGANIZED HEALTH CARE EDUCATION/TRAINING PROGRAM

## 2023-11-13 RX ORDER — GABAPENTIN 300 MG/1
900 CAPSULE ORAL 3 TIMES DAILY
Qty: 90 CAPSULE | Refills: 2 | Status: SHIPPED | OUTPATIENT
Start: 2023-11-13 | End: 2024-01-09 | Stop reason: SINTOL

## 2023-11-13 NOTE — H&P (VIEW-ONLY)
11/13/2023     Assessment: Very pleasant 63-year-old female with worsening chronic lower back and predominantly right leg pain.  Symptoms have been intermittently severe and impact her daily activity.  She is not able to ambulate or stand for any length of time.  She has known spondylolisthesis with pars fracture at L5-S1.  This appears to be grade 2 with mild dynamic instability on flexion-extension films.  -Right lumbar radiculitis-currently suspect L5   -Lumbar spondylosis with known grade 2 anterolisthesis of L5-S1 and pars fracture  -HX of CKD and DMII - Last A1C under 8  -Overweight-advised on weight loss    11/13/23 update: patient reports aching pain across low back (R>L) as well as right butt cheek pain that is very sharp and severe worse when walking, bending forward, and standing. Patient reports that her gluteal pain has improved with gabapentin.     PLAN:  1)  Imaging/Diagnostic Studies: Extensively reviewed Dr. Bhakta's most recent note as well as last lumbar x-ray.  Will obtain lumbar MRI to further assess given her persistent pain despite over 6 weeks of conservative treatment.  Has been over 2 years since her last MRI  2)  Therapy/Rehabilitation: She has completed 6 weeks of physical therapy September 2023.  Advised that she should continue her home exercise program  3)  Pharmacological Management: New Rx provided for gabapentin-advised to titrate slowly potentially up to 900 mg 3 times daily.  Robaxin as needed also provided.  Advised on over-the-counter Tylenol Lidoderm patch usage.  4)  Spine/Surgical Interventions: Right L5 TFESI.  She understands seek further surgical care if she has any worsening neurological deficits    - patient will need sedation given past anxiety in relation to procedure tolerance. Of not she was not affected by low dose of sedation in the past.     5)  Alternative Treatments: May consider alternative treatment options in the future including manipulation (chiropractor  "versus osteopathic) and/or acupuncture if patient does not obtain optimal relief with initial treatment plan.  6)  Consultations:  None at this time  7)  Follow -up: 4-6 weeks or PRN if symptoms worsen/do not improve.   8)  Future treatment considerations: further titration of gabapentin/ trial of Lyrica    Thank you for visiting our office today. It was our pleasure to take part in your healthcare.     Do not hesitate to call with any questions regarding your plan of care after leaving at [phone]    To clinicians, thank you very much for this kind referral. It is a privilege to partner with you in the care of your patients. My office would be delighted to assist you with any further consultations or with questions regarding the plan of care outlined. Do not hesitate to call the office or contact me directly.     Sincerely,      Ena Newman DO  MSK and Spine Fellow (PM&R), [facility] Department of Orthopedics   Academic Instructor, [employer]  [address] [address]. [place] [address]  Phone: [phone]  Fax: [phone]     Darlin Ryan is a 63 y.o. female who presents with chronic lower back pain - present for about 2007  Location:  Main area of pain is at the right buttock  Radiation:  intermittently down the right leg, posterior thigh to knee.  Also with some paresthesia.  No symptoms past the knee  Quality:  \"nerve pain\", deep ache  current 1/10,  at its worst 9/10  Exacerbated by walking, standing  Relieved by sitting or laying down   Onset, traumatic event:  no recent.  Notes a fall in 2016 with exacerbation of pain.    Has tried:  PT and injection in 2016 with relief.      Valsalva sign is pos  Grocery cart sign is neg  Smoker: no  Does not wake them at night  Litigation: none     Patient denies bowel/bladder incontinence, denies fever, denies unintentional weight loss, denies clumsiness of hands, feet, or dropping things.  Denies any " constitutional or myelopathic symptomatology.      PREVIOUS TREATMENTS  IN THE LAST SIX MONTHS     Active conservative therapy  in the last six months (see below)              1. Physical therapy:  yes, August 17th - completed over 6 weeks.                                                                                  2. Home exercise program after PT: yes                                                  3. A physician supervised home exercise program (HEP):  yes               4. Chiropractic Care: no                                                                   Passive conservative therapy  in the last six months (see below)              1.NSAIDS:  avoiding due to CKD                                                                                                         2. Prescription pain medication:   none                                                           3. Acupuncture:   no                                                                                          4. Tens unit: no     Assistive Devices:  none    Work status: Retired      ROS: Other than listed in HPI, PMHX below, and intake paperwork including a 30 point patient-recorded review of symptoms which was personally reviewed and inclusive of no history of unintentional weight loss, change in appetite, significant malaise, fevers, chills, or change in bowel/bladder, shortness of breath, or chest pain.    I have confirmed and edited as necessary Past Medical, Past Surgical, Family, Social History and ROS as obtained by others. These were also obtained on new patient forms.      PHYSICAL EXAM:   GENERAL APPEARANCE:  overweight, well developed, and no apparent distress.  NEURO PSYCH: Patient awake and alert, Mood pleasant. Benign affect.  MUSCULOSKELETAL and NEUROLOGICAL       VISUAL INSPECTION           LUMBAR: WNL  SPINE ROM:   LUMBAR ROM: Limited endrange flexion with reproduction of pain unchanged       MUSCLE BULK: Normal and symmetrical  in the lower extremities.  MUSCLE TONE: Normal  MOTOR: moving all extremities   SENSORY: Normal sensory exam to light touch to the lower extremities    DATA REVIEW:   The below imaging studies were personally reviewed and discussed with the patient.    Medical Decision Making:  The above note constitutes a Moderate to High level of medical decision making based on past data and imaging review, new and chronic symptoms with exacerbation, change in weakness or sensation, new imaging and diagnostic studies ordered, discussion of potential interventional or surgical treatment options, acute or chronic pain that may pose a threat to bodily function.    Past Medical History:   Diagnosis Date    Proteinuria 05/10/2023       Medication Documentation Review Audit       Reviewed by Elías Hinds MD (Physician) on 10/12/23 at 1004      Medication Order Taking? Sig Documenting Provider Last Dose Status   betamethasone dipropionate 0.05 % cream [id number] No g: Apply once a day x2 weeks, stop for one week then repeat as needed Randi Bearden MD Taking Active   blood sugar diagnostic (Blood Glucose Test) strip [id number] No TEST GLUCOSE ONCE DAILY AS DIRECTED FOR DIABETES Historical Provider, MD Taking Active   CeleXA 20 mg tablet [id number] No Take 1.5 tablets (30 mg) by mouth once daily. Historical Provider, MD Taking Active   cyanocobalamin, vitamin B-12, 5,000 mcg tablet, sublingual [id number] No Place 5,000 mcg under the tongue 2 times a week. Historical Provider, MD Taking Active   melatonin 10 mg capsule [id number] No Take 1 tablet by mouth once daily at bedtime. Historical Provider, MD Taking Active   tirzepatide (Mounjaro) 7.5 mg/0.5 mL pen injector [id number] No Inject 7.5 mg under the skin 1 (one) time per week. Teri Odonnell MD Taking Active   traZODone (Desyrel) 50 mg tablet [id number] No Take 1 tablet (50 mg) by mouth as needed at bedtime for sleep. Historical Provider, MD Taking Active                    Allergies    Allergen Reactions    Crestor [Rosuvastatin] Other     Muscle cramps    Pravastatin Other     Sever muscles cramps.       Social History     Socioeconomic History    Marital status:      Spouse name: Not on file    Number of children: Not on file    Years of education: Not on file    Highest education level: Not on file   Occupational History    Not on file   Tobacco Use    Smoking status: Former     Types: Cigarettes     Start date:      Quit date:      Years since quittin.8    Smokeless tobacco: Never   Vaping Use    Vaping Use: Never used   Substance and Sexual Activity    Alcohol use: Not Currently    Drug use: Never    Sexual activity: Not on file   Other Topics Concern    Not on file   Social History Narrative    Not on file     Social Determinants of Health     Financial Resource Strain: Not on file   Food Insecurity: Not on file   Transportation Needs: Not on file   Physical Activity: Not on file   Stress: Not on file   Social Connections: Not on file   Intimate Partner Violence: Not on file   Housing Stability: Not on file       Past Surgical History:   Procedure Laterality Date     SECTION, LOW TRANSVERSE      OTHER SURGICAL HISTORY  2020    Dilation and curettage    OTHER SURGICAL HISTORY  2020    Colonoscopy

## 2023-11-13 NOTE — PROGRESS NOTES
11/13/2023     Assessment: Very pleasant 63-year-old female with worsening chronic lower back and predominantly right leg pain.  Symptoms have been intermittently severe and impact her daily activity.  She is not able to ambulate or stand for any length of time.  She has known spondylolisthesis with pars fracture at L5-S1.  This appears to be grade 2 with mild dynamic instability on flexion-extension films.  -Right lumbar radiculitis-currently suspect L5   -Lumbar spondylosis with known grade 2 anterolisthesis of L5-S1 and pars fracture  -HX of CKD and DMII - Last A1C under 8  -Overweight-advised on weight loss    11/13/23 update: patient reports aching pain across low back (R>L) as well as right butt cheek pain that is very sharp and severe worse when walking, bending forward, and standing. Patient reports that her gluteal pain has improved with gabapentin.     PLAN:  1)  Imaging/Diagnostic Studies: Extensively reviewed Dr. Bhakta's most recent note as well as last lumbar x-ray.  Will obtain lumbar MRI to further assess given her persistent pain despite over 6 weeks of conservative treatment.  Has been over 2 years since her last MRI  2)  Therapy/Rehabilitation: She has completed 6 weeks of physical therapy September 2023.  Advised that she should continue her home exercise program  3)  Pharmacological Management: New Rx provided for gabapentin-advised to titrate slowly potentially up to 900 mg 3 times daily.  Robaxin as needed also provided.  Advised on over-the-counter Tylenol Lidoderm patch usage.  4)  Spine/Surgical Interventions: Right L5 TFESI.  She understands seek further surgical care if she has any worsening neurological deficits    - patient will need sedation given past anxiety in relation to procedure tolerance. Of not she was not affected by low dose of sedation in the past.     5)  Alternative Treatments: May consider alternative treatment options in the future including manipulation (chiropractor  "versus osteopathic) and/or acupuncture if patient does not obtain optimal relief with initial treatment plan.  6)  Consultations:  None at this time  7)  Follow -up: 4-6 weeks or PRN if symptoms worsen/do not improve.   8)  Future treatment considerations: further titration of gabapentin/ trial of Lyrica    Thank you for visiting our office today. It was our pleasure to take part in your healthcare.     Do not hesitate to call with any questions regarding your plan of care after leaving at [phone]    To clinicians, thank you very much for this kind referral. It is a privilege to partner with you in the care of your patients. My office would be delighted to assist you with any further consultations or with questions regarding the plan of care outlined. Do not hesitate to call the office or contact me directly.     Sincerely,      Ena Newman DO  MSK and Spine Fellow (PM&R), [facility] Department of Orthopedics   Academic Instructor, [employer]  [address] [address]. [place] [address]  Phone: [phone]  Fax: [phone]     Darlin Ryan is a 63 y.o. female who presents with chronic lower back pain - present for about 2007  Location:  Main area of pain is at the right buttock  Radiation:  intermittently down the right leg, posterior thigh to knee.  Also with some paresthesia.  No symptoms past the knee  Quality:  \"nerve pain\", deep ache  current 1/10,  at its worst 9/10  Exacerbated by walking, standing  Relieved by sitting or laying down   Onset, traumatic event:  no recent.  Notes a fall in 2016 with exacerbation of pain.    Has tried:  PT and injection in 2016 with relief.      Valsalva sign is pos  Grocery cart sign is neg  Smoker: no  Does not wake them at night  Litigation: none     Patient denies bowel/bladder incontinence, denies fever, denies unintentional weight loss, denies clumsiness of hands, feet, or dropping things.  Denies any " constitutional or myelopathic symptomatology.      PREVIOUS TREATMENTS  IN THE LAST SIX MONTHS     Active conservative therapy  in the last six months (see below)              1. Physical therapy:  yes, August 17th - completed over 6 weeks.                                                                                  2. Home exercise program after PT: yes                                                  3. A physician supervised home exercise program (HEP):  yes               4. Chiropractic Care: no                                                                   Passive conservative therapy  in the last six months (see below)              1.NSAIDS:  avoiding due to CKD                                                                                                         2. Prescription pain medication:   none                                                           3. Acupuncture:   no                                                                                          4. Tens unit: no     Assistive Devices:  none    Work status: Retired      ROS: Other than listed in HPI, PMHX below, and intake paperwork including a 30 point patient-recorded review of symptoms which was personally reviewed and inclusive of no history of unintentional weight loss, change in appetite, significant malaise, fevers, chills, or change in bowel/bladder, shortness of breath, or chest pain.    I have confirmed and edited as necessary Past Medical, Past Surgical, Family, Social History and ROS as obtained by others. These were also obtained on new patient forms.      PHYSICAL EXAM:   GENERAL APPEARANCE:  overweight, well developed, and no apparent distress.  NEURO PSYCH: Patient awake and alert, Mood pleasant. Benign affect.  MUSCULOSKELETAL and NEUROLOGICAL       VISUAL INSPECTION           LUMBAR: WNL  SPINE ROM:   LUMBAR ROM: Limited endrange flexion with reproduction of pain unchanged       MUSCLE BULK: Normal and symmetrical  in the lower extremities.  MUSCLE TONE: Normal  MOTOR: moving all extremities   SENSORY: Normal sensory exam to light touch to the lower extremities    DATA REVIEW:   The below imaging studies were personally reviewed and discussed with the patient.    Medical Decision Making:  The above note constitutes a Moderate to High level of medical decision making based on past data and imaging review, new and chronic symptoms with exacerbation, change in weakness or sensation, new imaging and diagnostic studies ordered, discussion of potential interventional or surgical treatment options, acute or chronic pain that may pose a threat to bodily function.    Past Medical History:   Diagnosis Date    Proteinuria 05/10/2023       Medication Documentation Review Audit       Reviewed by Elías Hinds MD (Physician) on 10/12/23 at 1004      Medication Order Taking? Sig Documenting Provider Last Dose Status   betamethasone dipropionate 0.05 % cream [id number] No g: Apply once a day x2 weeks, stop for one week then repeat as needed Randi Bearden MD Taking Active   blood sugar diagnostic (Blood Glucose Test) strip [id number] No TEST GLUCOSE ONCE DAILY AS DIRECTED FOR DIABETES Historical Provider, MD Taking Active   CeleXA 20 mg tablet [id number] No Take 1.5 tablets (30 mg) by mouth once daily. Historical Provider, MD Taking Active   cyanocobalamin, vitamin B-12, 5,000 mcg tablet, sublingual [id number] No Place 5,000 mcg under the tongue 2 times a week. Historical Provider, MD Taking Active   melatonin 10 mg capsule [id number] No Take 1 tablet by mouth once daily at bedtime. Historical Provider, MD Taking Active   tirzepatide (Mounjaro) 7.5 mg/0.5 mL pen injector [id number] No Inject 7.5 mg under the skin 1 (one) time per week. Teri Odonnell MD Taking Active   traZODone (Desyrel) 50 mg tablet [id number] No Take 1 tablet (50 mg) by mouth as needed at bedtime for sleep. Historical Provider, MD Taking Active                    Allergies    Allergen Reactions    Crestor [Rosuvastatin] Other     Muscle cramps    Pravastatin Other     Sever muscles cramps.       Social History     Socioeconomic History    Marital status:      Spouse name: Not on file    Number of children: Not on file    Years of education: Not on file    Highest education level: Not on file   Occupational History    Not on file   Tobacco Use    Smoking status: Former     Types: Cigarettes     Start date:      Quit date:      Years since quittin.8    Smokeless tobacco: Never   Vaping Use    Vaping Use: Never used   Substance and Sexual Activity    Alcohol use: Not Currently    Drug use: Never    Sexual activity: Not on file   Other Topics Concern    Not on file   Social History Narrative    Not on file     Social Determinants of Health     Financial Resource Strain: Not on file   Food Insecurity: Not on file   Transportation Needs: Not on file   Physical Activity: Not on file   Stress: Not on file   Social Connections: Not on file   Intimate Partner Violence: Not on file   Housing Stability: Not on file       Past Surgical History:   Procedure Laterality Date     SECTION, LOW TRANSVERSE      OTHER SURGICAL HISTORY  2020    Dilation and curettage    OTHER SURGICAL HISTORY  2020    Colonoscopy

## 2023-11-16 ENCOUNTER — PREP FOR PROCEDURE (OUTPATIENT)
Dept: ORTHOPEDIC SURGERY | Facility: CLINIC | Age: 63
End: 2023-11-16
Payer: COMMERCIAL

## 2023-11-16 DIAGNOSIS — M54.16 LUMBAR RADICULITIS: Primary | ICD-10-CM

## 2023-11-16 DIAGNOSIS — M43.16 SPONDYLOLISTHESIS OF LUMBAR REGION: ICD-10-CM

## 2023-11-16 DIAGNOSIS — G89.29 CHRONIC RIGHT-SIDED LOW BACK PAIN WITH RIGHT-SIDED SCIATICA: Chronic | ICD-10-CM

## 2023-11-16 DIAGNOSIS — M54.41 CHRONIC RIGHT-SIDED LOW BACK PAIN WITH RIGHT-SIDED SCIATICA: Chronic | ICD-10-CM

## 2023-11-22 ENCOUNTER — APPOINTMENT (OUTPATIENT)
Dept: ORTHOPEDIC SURGERY | Facility: CLINIC | Age: 63
End: 2023-11-22
Payer: COMMERCIAL

## 2023-12-08 ENCOUNTER — ANCILLARY PROCEDURE (OUTPATIENT)
Dept: RADIOLOGY | Facility: CLINIC | Age: 63
End: 2023-12-08
Payer: COMMERCIAL

## 2023-12-08 ENCOUNTER — HOSPITAL ENCOUNTER (OUTPATIENT)
Dept: OPERATING ROOM | Facility: CLINIC | Age: 63
Setting detail: OUTPATIENT SURGERY
Discharge: HOME | End: 2023-12-08
Payer: COMMERCIAL

## 2023-12-08 VITALS
TEMPERATURE: 97 F | SYSTOLIC BLOOD PRESSURE: 144 MMHG | BODY MASS INDEX: 41.6 KG/M2 | DIASTOLIC BLOOD PRESSURE: 64 MMHG | RESPIRATION RATE: 16 BRPM | OXYGEN SATURATION: 95 % | HEIGHT: 66 IN | HEART RATE: 57 BPM | WEIGHT: 258.82 LBS

## 2023-12-08 DIAGNOSIS — G89.29 CHRONIC RIGHT-SIDED LOW BACK PAIN WITH RIGHT-SIDED SCIATICA: Chronic | ICD-10-CM

## 2023-12-08 DIAGNOSIS — M54.16 LUMBAR RADICULITIS: ICD-10-CM

## 2023-12-08 DIAGNOSIS — M54.41 CHRONIC RIGHT-SIDED LOW BACK PAIN WITH RIGHT-SIDED SCIATICA: Chronic | ICD-10-CM

## 2023-12-08 DIAGNOSIS — M43.16 SPONDYLOLISTHESIS OF LUMBAR REGION: ICD-10-CM

## 2023-12-08 LAB — POC FINGERSTICK BLOOD GLUCOSE: 147 MG/DL (ref 70–100)

## 2023-12-08 PROCEDURE — 64483 NJX AA&/STRD TFRM EPI L/S 1: CPT

## 2023-12-08 PROCEDURE — 2500000005 HC RX 250 GENERAL PHARMACY W/O HCPCS

## 2023-12-08 PROCEDURE — 2500000004 HC RX 250 GENERAL PHARMACY W/ HCPCS (ALT 636 FOR OP/ED)

## 2023-12-08 PROCEDURE — 82962 GLUCOSE BLOOD TEST: CPT | Performed by: PHYSICAL MEDICINE & REHABILITATION

## 2023-12-08 PROCEDURE — 94760 N-INVAS EAR/PLS OXIMETRY 1: CPT

## 2023-12-08 PROCEDURE — 77003 FLUOROGUIDE FOR SPINE INJECT: CPT | Mod: RSC

## 2023-12-08 PROCEDURE — 64483 NJX AA&/STRD TFRM EPI L/S 1: CPT | Performed by: PHYSICAL MEDICINE & REHABILITATION

## 2023-12-08 PROCEDURE — 2550000001 HC RX 255 CONTRASTS

## 2023-12-08 RX ORDER — SODIUM BICARBONATE 42 MG/ML
INJECTION, SOLUTION INTRAVENOUS AS NEEDED
Status: COMPLETED | OUTPATIENT
Start: 2023-12-08 | End: 2023-12-08

## 2023-12-08 RX ORDER — DEXAMETHASONE SODIUM PHOSPHATE 100 MG/10ML
INJECTION INTRAMUSCULAR; INTRAVENOUS AS NEEDED
Status: COMPLETED | OUTPATIENT
Start: 2023-12-08 | End: 2023-12-08

## 2023-12-08 RX ORDER — LIDOCAINE HYDROCHLORIDE 10 MG/ML
INJECTION INFILTRATION; PERINEURAL AS NEEDED
Status: COMPLETED | OUTPATIENT
Start: 2023-12-08 | End: 2023-12-08

## 2023-12-08 RX ORDER — MIDAZOLAM HYDROCHLORIDE 1 MG/ML
INJECTION INTRAMUSCULAR; INTRAVENOUS AS NEEDED
Status: COMPLETED | OUTPATIENT
Start: 2023-12-08 | End: 2023-12-08

## 2023-12-08 RX ADMIN — LIDOCAINE HYDROCHLORIDE 10 ML: 10 INJECTION, SOLUTION INFILTRATION; PERINEURAL at 10:47

## 2023-12-08 RX ADMIN — IOHEXOL 5 ML: 240 INJECTION, SOLUTION INTRATHECAL; INTRAVASCULAR; INTRAVENOUS; ORAL at 10:49

## 2023-12-08 RX ADMIN — MIDAZOLAM HYDROCHLORIDE 2 MG: 1 INJECTION, SOLUTION INTRAMUSCULAR; INTRAVENOUS at 10:37

## 2023-12-08 RX ADMIN — SODIUM BICARBONATE 1 MEQ: 42 INJECTION, SOLUTION INTRAVENOUS at 10:48

## 2023-12-08 RX ADMIN — DEXAMETHASONE SODIUM PHOSPHATE 10 MG: 10 INJECTION INTRAMUSCULAR; INTRAVENOUS at 10:54

## 2023-12-08 ASSESSMENT — PAIN - FUNCTIONAL ASSESSMENT
PAIN_FUNCTIONAL_ASSESSMENT: 0-10

## 2023-12-08 ASSESSMENT — PAIN SCALES - GENERAL
PAINLEVEL_OUTOF10: 0 - NO PAIN
PAINLEVEL_OUTOF10: 0 - NO PAIN
PAINLEVEL_OUTOF10: 5 - MODERATE PAIN
PAINLEVEL_OUTOF10: 0 - NO PAIN

## 2023-12-08 ASSESSMENT — PAIN DESCRIPTION - DESCRIPTORS: DESCRIPTORS: ACHING

## 2023-12-08 NOTE — OP NOTE
Operative Note - Medical Spine / PM&R     Date: 2023  [place] [facility]    Name: Darlin Ryan : 1960, Age: 63 y.o., MRN: [id number], Sex: female    Diagnosis  * No Diagnosis Codes entered * * No Diagnosis Codes entered *     1. Lumbar radiculitis    2. Spondylolisthesis of lumbar region    3. Chronic right-sided low back pain with right-sided sciatica        Procedures  Right L5 Transforaminal epidural steroid injection    Surgeons   Matthias Monk MD      Resident/Fellow/Other Assistant:  Surgeon(s) and Role:     * Laura Ramey DO - Resident - Assisting Dr Ena Newman Fellow    Procedure Summary  Estimated Blood Loss: 0mL    Intraprocedure I/O Totals       None           Specimen: No specimens collected     Staff:   Circulator: Markus Huff RN  Scrub Person: Amna Oh RN    Implants:  none      Findings: below    Indications: Darlin Ryan is an 63 y.o. female who is having this procedure for indications above.  The patient was seen in the preoperative area. The risks, benefits, complications, treatment options, non-operative alternatives, expected recovery and outcomes were discussed with the patient. The possibilities of reaction to medication, pulmonary aspiration, injury to surrounding structures, bleeding, recurrent infection, the need for additional procedures, failure to diagnose a condition, and creating a complication requiring transfusion or operation were discussed with the patient. The patient concurred with the proposed plan, giving informed consent.  The site of surgery was properly noted/marked if necessary per policy. The patient has been actively warmed in preoperative area. Preoperative antibiotics not indicated.  Venous thrombosis prophylaxis not indicated    Procedure Details: Sedation: Versed 2mg IV was good    The patient was then taken back to the procedure room and was placed in a prone position and routine noninvasive monitors were  applied.  A timeout was performed verifying patient identification, site and allergies.   The back was prepped and draped in a sterile fashion. Under direct fluoroscopic visualization, the Right L5 transverse process was identified. The skin and subcutaneous tissues were anesthetized with 2cc of lidocaine and 0.5 cc sodium bicarbonate . Under direct fluoroscopic guidance,  a SEVEN  inch, 22 gauge spinal needle was directed obliquely to contact the inferior aspect of the transverse process and then directed to enter the neural foramen.  Needle placement was confirmed on both AP and lateral views prior to the contrast injection. Omnipaque 2 cc was injected,  confirming epiradicular flow pattern and highlighting the L5 spinal nerve. The injection was completed with 1  cc of DEXAMETHASONE 10mg/cc and 2.5 cc of lidocaine, preservative free.      The patient tolerated the procedure well and without complications and was taken back to the recovery area in good condition.  Post procedure care, precautions and instructions given and patient verbalized understanding..    Complications:  None     Disposition: PACU in stable condition    Attending Attestation: I was present for the entire procedure.    Matthias Monk MD

## 2023-12-08 NOTE — DISCHARGE INSTRUCTIONS
"Post Procedure Instructions     1. You MUST have a  to take you home and be available to assist you at home if needed, unless specifically arranged before procedure.     2. Get up from your seated or lying position slowly and carefully. It is not unusual to have some \"numbness\" in your back or legs following a lumbar injection. After a cervical injection it is not abnormal to have arm or neck numbness. The symptoms (if they occur) may last a few hours, but will wear off. Have someone assist you as you walk if numbness in your legs occurs.     3. Complete sensory block is the intent of an injection to nerves. Occasionally in trying to achieve sensory blockade you also receive a motor blockade (weak arm or leg)     4. If you receive sedation, do not drive a motorized vehicle for 24 hours.     5. Avoid ALL alcoholic beverages the day of your procedure.     6. Discuss sleep and pain medications with your prescribing physician if you received sedation.     7. It is safe to resume medications once home.     8. Tomorrow you may resume regular activities to the level your pain will tolerate. The exception is no lifting over 20 pounds for 36 hours. You will likely experience a temporary exacerbation of pre-injection pain when anesthetic medication wears off.     9. Remove your band aid tomorrow.     10. Do not take a tub bath or submerge in water for 48 hours. You may shower.     11. Apply ice to your injection site if it is swollen or hurts after the injection. Only apply 20 minutes on, then off for 20 minutes. Take the ice off as you sleep.     12. Call scheduling office at [phone] to verify a follow-up appointment 4-6 weeks after your procedure / injection with your pain diary.     13. Call your physician immediately or go to the emergency room for any of the following symptoms:    Severe pain at your injection site (tightness or aching is normal)    Pain, redness, pus, or leaking fluid at the injection site    " "Prolonged or increasing numbness 14 hours after a block    A temperature over 101.5 degrees F and / or chills    Excessive bruising, bleeding, or swelling at the injection site    Loss of bowel or bladder control or \"saddle anesthesia\"    Inability to speak, facial droop, and / or limb paralysis     Note: if you have any questions please contact our office at [phone], if the office is closed please call the after hours phone number at [phone] and ask for the pain resident on call    Matthias Monk M.D, FAAPMR, R-MSK  Chief, Division of PM&R  Board Certified in PM&R, Sports Medicine and Musculoskeletal Ultrasound    To schedule FOLLOW-UP appointments, please use the call center at [phone]  - Dr Ena Newman at [place]. DR Valerie Lewis at [place] or Dr Hinds if he's back from leave  To schedule future PROCEDURES or for questions for the doctor please call [phone].      "

## 2023-12-11 ASSESSMENT — PAIN SCALES - GENERAL: PAINLEVEL_OUTOF10: 3

## 2023-12-11 NOTE — ADDENDUM NOTE
Encounter addended by: Amelie Buchanan RN on: 12/11/2023 9:15 AM   Actions taken: Contacts section saved, Flowsheet accepted
138

## 2023-12-18 ENCOUNTER — LAB (OUTPATIENT)
Dept: LAB | Facility: LAB | Age: 63
End: 2023-12-18
Payer: COMMERCIAL

## 2023-12-18 DIAGNOSIS — E11.65 CONTROLLED TYPE 2 DIABETES MELLITUS WITH HYPERGLYCEMIA, WITHOUT LONG-TERM CURRENT USE OF INSULIN (MULTI): ICD-10-CM

## 2023-12-18 LAB
ANION GAP SERPL CALC-SCNC: 17 MMOL/L (ref 10–20)
BUN SERPL-MCNC: 13 MG/DL (ref 6–23)
CALCIUM SERPL-MCNC: 9.3 MG/DL (ref 8.6–10.3)
CHLORIDE SERPL-SCNC: 102 MMOL/L (ref 98–107)
CO2 SERPL-SCNC: 27 MMOL/L (ref 21–32)
CREAT SERPL-MCNC: 1.19 MG/DL (ref 0.5–1.05)
EST. AVERAGE GLUCOSE BLD GHB EST-MCNC: 143 MG/DL
GFR SERPL CREATININE-BSD FRML MDRD: 51 ML/MIN/1.73M*2
GLUCOSE SERPL-MCNC: 138 MG/DL (ref 74–99)
HBA1C MFR BLD: 6.6 %
POTASSIUM SERPL-SCNC: 4.5 MMOL/L (ref 3.5–5.3)
SODIUM SERPL-SCNC: 141 MMOL/L (ref 136–145)

## 2023-12-18 PROCEDURE — 83036 HEMOGLOBIN GLYCOSYLATED A1C: CPT

## 2023-12-18 PROCEDURE — 36415 COLL VENOUS BLD VENIPUNCTURE: CPT

## 2023-12-18 PROCEDURE — 80048 BASIC METABOLIC PNL TOTAL CA: CPT

## 2023-12-19 ENCOUNTER — OFFICE VISIT (OUTPATIENT)
Dept: PRIMARY CARE | Facility: CLINIC | Age: 63
End: 2023-12-19
Payer: COMMERCIAL

## 2023-12-19 VITALS
TEMPERATURE: 97.5 F | RESPIRATION RATE: 16 BRPM | OXYGEN SATURATION: 97 % | DIASTOLIC BLOOD PRESSURE: 72 MMHG | WEIGHT: 257.8 LBS | BODY MASS INDEX: 41.61 KG/M2 | HEART RATE: 64 BPM | SYSTOLIC BLOOD PRESSURE: 128 MMHG

## 2023-12-19 DIAGNOSIS — E78.5 HYPERLIPIDEMIA, MILD: Chronic | ICD-10-CM

## 2023-12-19 DIAGNOSIS — E11.65 CONTROLLED TYPE 2 DIABETES MELLITUS WITH HYPERGLYCEMIA, WITHOUT LONG-TERM CURRENT USE OF INSULIN (MULTI): ICD-10-CM

## 2023-12-19 DIAGNOSIS — F32.5 MAJOR DEPRESSIVE DISORDER WITH SINGLE EPISODE, IN FULL REMISSION (CMS-HCC): ICD-10-CM

## 2023-12-19 DIAGNOSIS — I10 BENIGN ESSENTIAL HYPERTENSION: ICD-10-CM

## 2023-12-19 DIAGNOSIS — E53.8 B12 DEFICIENCY: Primary | ICD-10-CM

## 2023-12-19 DIAGNOSIS — N18.31 STAGE 3A CHRONIC KIDNEY DISEASE (MULTI): ICD-10-CM

## 2023-12-19 PROBLEM — S16.1XXA CERVICAL STRAIN, INITIAL ENCOUNTER: Status: RESOLVED | Noted: 2023-05-10 | Resolved: 2023-12-19

## 2023-12-19 PROCEDURE — 99214 OFFICE O/P EST MOD 30 MIN: CPT | Performed by: INTERNAL MEDICINE

## 2023-12-19 PROCEDURE — 3078F DIAST BP <80 MM HG: CPT | Performed by: INTERNAL MEDICINE

## 2023-12-19 PROCEDURE — 3074F SYST BP LT 130 MM HG: CPT | Performed by: INTERNAL MEDICINE

## 2023-12-19 PROCEDURE — 1036F TOBACCO NON-USER: CPT | Performed by: INTERNAL MEDICINE

## 2023-12-19 PROCEDURE — 3044F HG A1C LEVEL LT 7.0%: CPT | Performed by: INTERNAL MEDICINE

## 2023-12-19 RX ORDER — TIRZEPATIDE 7.5 MG/.5ML
7.5 INJECTION, SOLUTION SUBCUTANEOUS
Qty: 12 ML | Refills: 1 | Status: CANCELLED | OUTPATIENT
Start: 2023-12-19

## 2023-12-19 RX ORDER — TIRZEPATIDE 10 MG/.5ML
10 INJECTION, SOLUTION SUBCUTANEOUS
Qty: 2 ML | Refills: 0 | Status: SHIPPED | OUTPATIENT
Start: 2023-12-19 | End: 2024-01-22 | Stop reason: ALTCHOICE

## 2023-12-19 ASSESSMENT — ENCOUNTER SYMPTOMS
ARTHRALGIAS: 1
BACK PAIN: 1
DIZZINESS: 0
WEAKNESS: 0
FATIGUE: 0
ABDOMINAL PAIN: 0
SEIZURES: 0
POLYPHAGIA: 0
TREMORS: 0
DIARRHEA: 0
CONFUSION: 0
HEADACHES: 0
SPEECH DIFFICULTY: 0
CONSTIPATION: 0
NERVOUS/ANXIOUS: 0
POLYDIPSIA: 0

## 2023-12-19 NOTE — PROGRESS NOTES
Subjective   Patient ID: Darlin Ryan is a 63 y.o. female who presents for Follow-up (4 month) and Medication Question (Wanted to know if you could take over the refills for celexa.).    HPI     Here for followup  Did her labs before  Asks if I can prescribe celexa. Has been stable for years. States had really bad bout of depression. No changes and feels good    She also states had injection in spine and did not help  She had cataract surgery    She states last few months diet was poor. Lots of candy. Last 2 weeks cut out the candy  She states less active due to the spine    Wants to lose weight    She is on Mounjaro    Review of Systems   Constitutional:  Negative for fatigue.   Cardiovascular:  Negative for chest pain.   Gastrointestinal:  Negative for abdominal pain, constipation and diarrhea.   Endocrine: Negative for polydipsia, polyphagia and polyuria.   Musculoskeletal:  Positive for arthralgias and back pain.   Skin:  Negative for pallor.   Neurological:  Negative for dizziness, tremors, seizures, speech difficulty, weakness and headaches.   Psychiatric/Behavioral:  Negative for confusion. The patient is not nervous/anxious.        Objective   /72   Pulse 64   Temp 36.4 °C (97.5 °F)   Resp 16   Wt 117 kg (257 lb 12.8 oz)   SpO2 97%   BMI 41.61 kg/m²     Physical Exam  Constitutional:       Appearance: Normal appearance.   Cardiovascular:      Rate and Rhythm: Normal rate and regular rhythm.      Heart sounds: Normal heart sounds. No murmur heard.     No gallop.   Pulmonary:      Effort: Pulmonary effort is normal. No respiratory distress.      Breath sounds: Normal breath sounds.   Musculoskeletal:      Right lower leg: No edema.      Left lower leg: No edema.   Neurological:      Mental Status: She is alert.         Assessment/Plan   Problem List Items Addressed This Visit             ICD-10-CM    B12 deficiency - Primary E53.8    Relevant Orders    Vitamin B12    Benign essential hypertension  I10    Depression F32.A    Diabetes type 2, controlled (CMS/[facility]) E11.9    Relevant Medications    tirzepatide (Mounjaro) 10 mg/0.5 mL pen injector    Other Relevant Orders    CBC    Comprehensive Metabolic Panel    Lipid Panel    Hemoglobin A1C    Albumin , Urine Random    Hyperlipidemia, mild (Chronic) E78.5    Stage 3a chronic kidney disease (CMS/[facility]) N18.31       Obesity:  -topamax did not help  -Phentermine helped. Down 20 pounds.   -On mounjaro. Titrate monthly  -did not have an affect with ozempic     Muscle aches: sounds like from statin  -give another 4 months off and consider lovastatin next visit, zetia or injections     TMJ issues:      New onset type 2 diabetes: a1c: 11.3--> 6.3--> 6.4--> 5.6-->6.1--> 5.3--> 5.4--> 5/8-->6.1--> 6.6  -worsening  -off metformin  -on mounjaro-increase and titrate monthly  -+ urine microalbumin (1/20)--> now normal 10/22     Migraines from metformin: off topamax     Hypertension: resolved off of meds  -Lisinopril caused migraines     Proteinuria: resolved     Leukopenia: resolved     Abnormal TSH: TSH barely up and free t4 ok     Hyperlipidemia: uncontrolled  -crestor and pravastatin caused muscle aches  -repeat next visit     Shoulder pain and neck pain: seeing ortho  -has a tear    Known Fatty liver:   -weight loss  -liver enzymes normalized     Depression  -follows with Dr. Hilda Lockwood ([place])--has not seen  -controlled on celexa and desyrel and prn vistaril  -if stable, I can prescribe. If worsens back to psychiatry     Chronic back pain: herniated disc, DDD, and sciatica  -she has had injections  -can use salon past patches, voltaren gel  -seeing pain mgmt  -did injection  -on gabapentin     Hx of lichen planus: follows with dermatology--Dr. Drake     Venous insufficiency: compression stockings, elevate legs     B12 deficiency: cont b12 pills      4 months f/u with labs before     Health Maintenance:   -pap smear: 10/20- neg, repeat 5 years  -Mammogram: refused  mammo. Interested in MRI-ordered  -Colonoscopy: 10/26/22- cologuard neg  -Lung Cancer Screening: quit 2004  -Vaccinations: Pneumovax UTD. advised shingrix and tdap. Flu UTD  -DEXA: at 64

## 2024-01-09 ENCOUNTER — OFFICE VISIT (OUTPATIENT)
Dept: ORTHOPEDIC SURGERY | Facility: CLINIC | Age: 64
End: 2024-01-09
Payer: COMMERCIAL

## 2024-01-09 DIAGNOSIS — M54.16 LUMBAR RADICULITIS: Primary | ICD-10-CM

## 2024-01-09 PROCEDURE — 1036F TOBACCO NON-USER: CPT | Performed by: STUDENT IN AN ORGANIZED HEALTH CARE EDUCATION/TRAINING PROGRAM

## 2024-01-09 PROCEDURE — 99214 OFFICE O/P EST MOD 30 MIN: CPT | Performed by: STUDENT IN AN ORGANIZED HEALTH CARE EDUCATION/TRAINING PROGRAM

## 2024-01-09 RX ORDER — PREGABALIN 25 MG/1
25 CAPSULE ORAL 2 TIMES DAILY
Qty: 60 CAPSULE | Refills: 2 | Status: SHIPPED | OUTPATIENT
Start: 2024-01-09 | End: 2024-04-11 | Stop reason: WASHOUT

## 2024-01-09 SDOH — SOCIAL STABILITY: SOCIAL NETWORK: SOCIAL ACTIVITY:: 5

## 2024-01-09 NOTE — PROGRESS NOTES
1/9/2024     Assessment: Very pleasant 63-year-old female with worsening chronic lower back and predominantly right leg pain.  Symptoms have been intermittently severe and impact her daily activity.  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  She has known spondylolisthesis with pars fracture at L5-S1.  This appears to be grade 2 with mild dynamic instability on flexion-extension films.  -Right lumbar radiculitis-currently suspect L5   -Lumbar spondylosis with known grade 2 anterolisthesis of L5-S1 and pars fracture  -HX of CKD and DMII - Last A1C under 6.6  -Overweight-advised on weight loss    01/09/23 update: patient reports aching pain across low back (R>L) severe worse when walking, bending forward, and standing. Patient reports that her gluteal pain has improved with L5 TFESI. Stopped gabapentin due to diarrhea. Reports weight loss (keto diet); 15 pounds.    - s/p Right L5 transforaminal epidural steroid injection on 12/08/23 with 50% relief kicking in 2 weeks post injection still lasting.    PLAN:  1)  Imaging/Diagnostic Studies: Extensively reviewed Dr. Bhakta's most recent note as well as last lumbar x-ray.    2)  Therapy/Rehabilitation: She has completed 6 weeks of physical therapy September 2023.  Advised that she should continue her home exercise program  3)  Pharmacological Management: New Rx provided for lyrica-advised to titrate slowly potentially up to 25 mg 2 times daily.  Robaxin as needed also provided.  Advised on over-the-counter Tylenol Lidoderm patch usage.  4)  Spine/Surgical Interventions: Right L4 transforaminal epidural injection.  She understands seek further surgical care if she has any worsening neurological deficits    - patient will need sedation given past anxiety in relation to procedure tolerance. Of not she was not affected by low dose of sedation in the past.     5)  Alternative Treatments: May consider alternative treatment options in the future  "including manipulation (chiropractor versus osteopathic) and/or acupuncture if patient does not obtain optimal relief with initial treatment plan.  6)  Consultations:  None at this time  7)  Follow -up: 4-6 weeks or PRN if symptoms worsen/do not improve.   8)  Future treatment considerations: further titration of gabapentin/ trial of Lyrica    Thank you for visiting our office today. It was our pleasure to take part in your healthcare.     Do not hesitate to call with any questions regarding your plan of care after leaving at [phone]    To clinicians, thank you very much for this kind referral. It is a privilege to partner with you in the care of your patients. My office would be delighted to assist you with any further consultations or with questions regarding the plan of care outlined. Do not hesitate to call the office or contact me directly.     Sincerely,      Ena Newman DO MSK and Spine Fellow (PM&R), [facility] Department of Orthopedics   Academic Instructor, [employer]  [address], [facility] [address] [place] [address]  Phone: [phone]  Fax: [phone]     Darlin Ryan is a 63 y.o. female who presents with chronic lower back pain - present for about 2007  Location:  Main area of pain is at the right buttock  Radiation:  intermittently down the right leg, posterior thigh to knee.  Also with some paresthesia.  No symptoms past the knee  Quality:  \"nerve pain\", deep ache  current 0/10,  at its worst 5/10  Exacerbated by walking, standing  Relieved by sitting or laying down   Onset, traumatic event:  no recent.  Notes a fall in 2016 with exacerbation of pain.    Has tried:  L5 TFESI    Valsalva sign is neg  Grocery cart sign is neg  Smoker: no  Does not wake them at night  Litigation: none     Patient denies bowel/bladder incontinence, denies fever, denies unintentional weight loss, denies clumsiness of hands, feet, or dropping things.  Denies any " constitutional or myelopathic symptomatology.      PREVIOUS TREATMENTS  IN THE LAST SIX MONTHS     Active conservative therapy  in the last six months (see below)              1. Physical therapy:  yes, August 17th - completed over 6 weeks.                                                                                  2. Home exercise program after PT: yes                                                  3. A physician supervised home exercise program (HEP):  yes               4. Chiropractic Care: no                                                                   Passive conservative therapy  in the last six months (see below)              1.NSAIDS:  avoiding due to CKD                                                                                                         2. Prescription pain medication:   none                                                           3. Acupuncture:   no                                                                                          4. Tens unit: no     Assistive Devices:  none    Work status: Retired      ROS: Other than listed in HPI, PMHX below, and intake paperwork including a 30 point patient-recorded review of symptoms which was personally reviewed and inclusive of no history of unintentional weight loss, change in appetite, significant malaise, fevers, chills, or change in bowel/bladder, shortness of breath, or chest pain.    I have confirmed and edited as necessary Past Medical, Past Surgical, Family, Social History and ROS as obtained by others. These were also obtained on new patient forms.      PHYSICAL EXAM:   GENERAL APPEARANCE:  overweight, well developed, and no apparent distress.  NEURO PSYCH: Patient awake and alert, Mood pleasant. Benign affect.  MUSCULOSKELETAL and NEUROLOGICAL       VISUAL INSPECTION           LUMBAR: WNL  SPINE ROM:   LUMBAR ROM: Limited endrange flexion with reproduction of pain unchanged       MUSCLE BULK: Normal and symmetrical  in the lower extremities.  MUSCLE TONE: Normal  MOTOR: moving all extremities   SENSORY: Normal sensory exam to light touch to the lower extremities    DATA REVIEW:   The below imaging studies were personally reviewed and discussed with the patient.    Medical Decision Making:  The above note constitutes a Moderate to High level of medical decision making based on past data and imaging review, new and chronic symptoms with exacerbation, change in weakness or sensation, new imaging and diagnostic studies ordered, discussion of potential interventional or surgical treatment options, acute or chronic pain that may pose a threat to bodily function.    Past Medical History:   Diagnosis Date    Anxiety     Proteinuria 05/10/2023       Medication Documentation Review Audit       Reviewed by Adeola Sethi RN (Registered Nurse) on 01/09/24 at 1312      Medication Order Taking? Sig Documenting Provider Last Dose Status   betamethasone dipropionate 0.05 % cream [id number] Yes g: Apply once a day x2 weeks, stop for one week then repeat as needed Historical Provider, MD  Active   blood sugar diagnostic (Blood Glucose Test) strip [id number] Yes TEST GLUCOSE ONCE DAILY AS DIRECTED FOR DIABETES Historical Provider, MD  Active   CeleXA 20 mg tablet [id number] Yes Take 1.5 tablets (30 mg) by mouth once daily. Historical Provider, MD  Active    Patient taking differently:   Discontinued 01/09/24 1312   melatonin 10 mg capsule [id number] Yes Take 1 tablet by mouth once daily at bedtime. Historical Provider, MD  Active   methocarbamol (Robaxin) 500 mg tablet [id number] Yes 1-2 tabs every 8 hrs as needed for muscle spasm Antimo MARYANN Hinds MD  Active   tirzepatide (Mounjaro) 10 mg/0.5 mL pen injector [id number] Yes Inject 10 mg under the skin 1 (one) time per week. Teri Odonnell MD  Active   traZODone (Desyrel) 50 mg tablet [id number] Yes Take 1 tablet (50 mg) by mouth as needed at bedtime for sleep. Historical MD Monisha  Active                     Allergies   Allergen Reactions    Crestor [Rosuvastatin] Other     Muscle cramps    Pravastatin Other     Sever muscles cramps.       Social History     Socioeconomic History    Marital status:      Spouse name: Not on file    Number of children: Not on file    Years of education: Not on file    Highest education level: Not on file   Occupational History    Not on file   Tobacco Use    Smoking status: Former     Types: Cigarettes     Start date:      Quit date:      Years since quittin.0    Smokeless tobacco: Never   Vaping Use    Vaping Use: Never used   Substance and Sexual Activity    Alcohol use: Not Currently    Drug use: Never    Sexual activity: Not on file   Other Topics Concern    Not on file   Social History Narrative    Not on file     Social Determinants of Health     Financial Resource Strain: Not on file   Food Insecurity: Not on file   Transportation Needs: Not on file   Physical Activity: Not on file   Stress: Not on file   Social Connections: Not on file   Intimate Partner Violence: Not on file   Housing Stability: Not on file       Past Surgical History:   Procedure Laterality Date    CATARACT EXTRACTION Left 2023     SECTION, LOW TRANSVERSE      EYE SURGERY      OTHER SURGICAL HISTORY  2020    Dilation and curettage    OTHER SURGICAL HISTORY  2020    Colonoscopy

## 2024-01-22 ENCOUNTER — TELEPHONE (OUTPATIENT)
Dept: PRIMARY CARE | Facility: CLINIC | Age: 64
End: 2024-01-22
Payer: COMMERCIAL

## 2024-01-22 DIAGNOSIS — E11.65 CONTROLLED TYPE 2 DIABETES MELLITUS WITH HYPERGLYCEMIA, WITHOUT LONG-TERM CURRENT USE OF INSULIN (MULTI): ICD-10-CM

## 2024-01-22 RX ORDER — TIRZEPATIDE 12.5 MG/.5ML
12.5 INJECTION, SOLUTION SUBCUTANEOUS
Qty: 2 ML | Refills: 0 | Status: SHIPPED | OUTPATIENT
Start: 2024-01-22 | End: 2024-02-29 | Stop reason: SDUPTHER

## 2024-01-22 NOTE — TELEPHONE ENCOUNTER
Pt is calling because she has 1 week left of the mounjaro.    She mentioned that you were talking about increasing the dose.    DDM if increased.    Express scipts if same.    Please advise.

## 2024-02-02 ENCOUNTER — HOSPITAL ENCOUNTER (OUTPATIENT)
Dept: RADIOLOGY | Facility: CLINIC | Age: 64
Discharge: HOME | End: 2024-02-02
Payer: COMMERCIAL

## 2024-02-02 ENCOUNTER — HOSPITAL ENCOUNTER (OUTPATIENT)
Dept: OPERATING ROOM | Facility: CLINIC | Age: 64
Discharge: HOME | End: 2024-02-02
Payer: COMMERCIAL

## 2024-02-02 VITALS
SYSTOLIC BLOOD PRESSURE: 148 MMHG | HEART RATE: 59 BPM | TEMPERATURE: 97.7 F | DIASTOLIC BLOOD PRESSURE: 75 MMHG | HEIGHT: 66 IN | RESPIRATION RATE: 16 BRPM | BODY MASS INDEX: 40.32 KG/M2 | WEIGHT: 250.88 LBS | OXYGEN SATURATION: 99 %

## 2024-02-02 DIAGNOSIS — M54.16 LUMBAR RADICULITIS: ICD-10-CM

## 2024-02-02 LAB
GLUCOSE BLD MANUAL STRIP-MCNC: 96 MG/DL (ref 74–99)
POC FINGERSTICK BLOOD GLUCOSE: 96 MG/DL (ref 70–100)

## 2024-02-02 PROCEDURE — 2550000001 HC RX 255 CONTRASTS: Performed by: PHYSICAL MEDICINE & REHABILITATION

## 2024-02-02 PROCEDURE — 64483 NJX AA&/STRD TFRM EPI L/S 1: CPT | Performed by: PHYSICAL MEDICINE & REHABILITATION

## 2024-02-02 PROCEDURE — 2500000004 HC RX 250 GENERAL PHARMACY W/ HCPCS (ALT 636 FOR OP/ED): Performed by: PHYSICAL MEDICINE & REHABILITATION

## 2024-02-02 PROCEDURE — 2500000005 HC RX 250 GENERAL PHARMACY W/O HCPCS: Performed by: PHYSICAL MEDICINE & REHABILITATION

## 2024-02-02 PROCEDURE — 77003 FLUOROGUIDE FOR SPINE INJECT: CPT | Performed by: PHYSICAL MEDICINE & REHABILITATION

## 2024-02-02 PROCEDURE — 82947 ASSAY GLUCOSE BLOOD QUANT: CPT | Mod: 59

## 2024-02-02 PROCEDURE — 82962 GLUCOSE BLOOD TEST: CPT | Performed by: PHYSICAL MEDICINE & REHABILITATION

## 2024-02-02 PROCEDURE — 64483 NJX AA&/STRD TFRM EPI L/S 1: CPT | Mod: RT | Performed by: PHYSICAL MEDICINE & REHABILITATION

## 2024-02-02 RX ORDER — LIDOCAINE HYDROCHLORIDE 10 MG/ML
INJECTION INFILTRATION; PERINEURAL AS NEEDED
Status: COMPLETED | OUTPATIENT
Start: 2024-02-02 | End: 2024-02-02

## 2024-02-02 RX ORDER — MIDAZOLAM HYDROCHLORIDE 1 MG/ML
1 INJECTION INTRAMUSCULAR; INTRAVENOUS AS NEEDED
Status: DISCONTINUED | OUTPATIENT
Start: 2024-02-02 | End: 2024-02-03 | Stop reason: HOSPADM

## 2024-02-02 RX ORDER — DEXAMETHASONE SODIUM PHOSPHATE 100 MG/10ML
INJECTION INTRAMUSCULAR; INTRAVENOUS AS NEEDED
Status: COMPLETED | OUTPATIENT
Start: 2024-02-02 | End: 2024-02-02

## 2024-02-02 RX ORDER — SODIUM BICARBONATE 42 MG/ML
INJECTION, SOLUTION INTRAVENOUS AS NEEDED
Status: COMPLETED | OUTPATIENT
Start: 2024-02-02 | End: 2024-02-02

## 2024-02-02 RX ADMIN — LIDOCAINE HYDROCHLORIDE 10 ML: 10 INJECTION, SOLUTION INFILTRATION; PERINEURAL at 09:50

## 2024-02-02 RX ADMIN — IOHEXOL 240 ML: 240 INJECTION, SOLUTION INTRATHECAL; INTRAVASCULAR; INTRAVENOUS; ORAL at 09:51

## 2024-02-02 RX ADMIN — SODIUM BICARBONATE 0.5 MEQ: 42 INJECTION, SOLUTION INTRAVENOUS at 09:50

## 2024-02-02 RX ADMIN — DEXAMETHASONE SODIUM PHOSPHATE 10 MG: 10 INJECTION INTRAMUSCULAR; INTRAVENOUS at 09:52

## 2024-02-02 ASSESSMENT — ENCOUNTER SYMPTOMS
FEVER: 0
NAUSEA: 0
CHEST TIGHTNESS: 0
APNEA: 0
SHORTNESS OF BREATH: 0
ABDOMINAL PAIN: 0
ARTHRALGIAS: 1
AGITATION: 0
BACK PAIN: 1
CHILLS: 0

## 2024-02-02 ASSESSMENT — PAIN SCALES - GENERAL
PAINLEVEL_OUTOF10: 0 - NO PAIN
PAINLEVEL_OUTOF10: 0 - NO PAIN

## 2024-02-02 ASSESSMENT — COLUMBIA-SUICIDE SEVERITY RATING SCALE - C-SSRS
6. HAVE YOU EVER DONE ANYTHING, STARTED TO DO ANYTHING, OR PREPARED TO DO ANYTHING TO END YOUR LIFE?: NO
2. HAVE YOU ACTUALLY HAD ANY THOUGHTS OF KILLING YOURSELF?: NO
1. IN THE PAST MONTH, HAVE YOU WISHED YOU WERE DEAD OR WISHED YOU COULD GO TO SLEEP AND NOT WAKE UP?: NO

## 2024-02-02 ASSESSMENT — PAIN - FUNCTIONAL ASSESSMENT
PAIN_FUNCTIONAL_ASSESSMENT: 0-10
PAIN_FUNCTIONAL_ASSESSMENT: 0-10

## 2024-02-02 NOTE — OP NOTE
"* No procedures listed * Operative Note     Date: 2024  [place] [facility] [place]    Name: Darlin Ryan, : 1960, Age: 63 y.o., MRN: [id number], Sex: female    Staff:   Circulator: Jamie Machuca RN  Scrub Person: Ayden Weeks RN    Procedure(s):Operation: Right S1 Transforaminal epidural steroid injection.     Pre-Op/Pre-Procedure Diagnosis: Lumbar radiculitis, lumbosacral radiculitis   Post-Op Diagnosis: same     Anesthesia:   Local only     Fluoroscopy time: See documentation     Estimated Blood Loss: None  Specimens: None  Drains: None  Complications: None  INDICATIONS: The patient has been referred by my colleague with concordant subjective, objective, and radiologic findings of  Lumbar radiculitis, referred for diagnostic and therapeutic Transforaminal epidural steroid injection with failure of prior conservative care with physical therapy and medications alone. At this time, the patient wishes to avoid surgery.      PROCEDURE: After obtaining both verbal and written informed consent, the patient was placed in a prone position on the fluoroscopic table in the procedure room, the patient's posterior lumbosacral spine was prepped and draped in usual sterile fashion using chlorhexidine. The patient was connected to noninvasive blood pressure, EKG, pulse oximetry monitoring, and monitored by a registered interventional nurse throughout the procedure. Before initiating procedure, all relevant information was verified in a \"time-out.\"      Skin wheal(s) were raised using 1% preservative-free lidocaine near the pedicle of the levels above which was localized by counting from the intersection of the iliac crest. Through the skin wheal a 22-gauge, 5-inch curved Quincke-tip spinal needle was inserted and advanced under direct fluoroscopic visualization in the AP, ipsilateral oblique and lateral planes, until the needle tip arrived at the \"6 o'clock\" position of the pedicle/s. Proper needle " placement was confirmed with 1 cc of Omnipaque contrast confirming good epidural flow of contrast and flow along the exiting nerve root without any intravascular uptake of contrast seen under live direct fluoroscopic visualization in the AP, oblique, and lateral planes. At this point 10 mg Dexamethasone and preservative- free 1% Lidocaine were infused in the epidural space. Adequate hemostasis was obtained at the needle puncture site. The patient's back was cleaned and a sterile dressing was applied. The patient was taken conscious and in stable condition to the recovery room. No complications as a result of this procedure. Post procedure precautions and instructions were reviewed with the patient who verbalized understanding.     Significant Findings: None  Pre-Op Pain: 7-8/10  Post-Op Pain: 0/10     Care Instructions: Discharge per protocol.  Medications: See medication section  Appointment: Patient to return 2-3 weeks to clinic with pain diary.  Discharge Condition: Good condition for discharge.  Patient discharged home when all discharge criterion met.        The patient was seen in the preoperative area. The risks, benefits, complications, treatment options, non-operative alternatives, expected recovery and outcomes were discussed with the patient. The possibilities of reaction to medication, pulmonary aspiration, injury to surrounding structures, bleeding, recurrent infection, the need for additional procedures, failure to diagnose a condition, and creating a complication requiring transfusion or operation were discussed with the patient. The patient concurred with the proposed plan, giving informed consent.  The site of surgery was properly noted/marked if necessary per policy.      Attending Attestation: I was present for the entire procedure.    *No primary surgeon found*

## 2024-02-02 NOTE — H&P
History Of Present Illness  Darlin Ryan is a 63 y.o. female presenting with Chronic right leg and back pain refractory to conservative treatment, achy and burning, worse with activity, better at rest, trying to avoid surgery.  Has had good relief with prior ZAKIA.  Trying to avoid surgery.     Past Medical History  Past Medical History:   Diagnosis Date    Anxiety     Proteinuria 05/10/2023       Surgical History  Past Surgical History:   Procedure Laterality Date    CATARACT EXTRACTION Left 2023     SECTION, LOW TRANSVERSE      EYE SURGERY      OTHER SURGICAL HISTORY  2020    Dilation and curettage    OTHER SURGICAL HISTORY  2020    Colonoscopy        Social History  She reports that she quit smoking about 20 years ago. Her smoking use included cigarettes. She started smoking about 50 years ago. She has never used smokeless tobacco. She reports that she does not currently use alcohol. She reports that she does not use drugs.    Family History  Family History   Problem Relation Name Age of Onset    Cancer Mother          bone cancer, lung cancer    Aneurysm Father          Allergies  Crestor [rosuvastatin] and Pravastatin    Review of Systems   Constitutional:  Negative for chills and fever.   Respiratory:  Negative for apnea, chest tightness and shortness of breath.    Cardiovascular:  Negative for chest pain.   Gastrointestinal:  Negative for abdominal pain and nausea.   Musculoskeletal:  Positive for arthralgias and back pain.   Psychiatric/Behavioral:  Negative for agitation.    All other systems reviewed and are negative.       Physical Exam  Vitals reviewed.   Constitutional:       Appearance: Normal appearance.   HENT:      Head: Atraumatic.   Pulmonary:      Effort: Pulmonary effort is normal.      Breath sounds: Normal breath sounds.   Neurological:      Mental Status: She is alert and oriented to person, place, and time. Mental status is at baseline.   Psychiatric:         Mood and  "Affect: Mood normal.         Behavior: Behavior is cooperative.          Last Recorded Vitals  Pulse 62, temperature 36.4 °C (97.5 °F), temperature source Temporal, resp. rate 16, height 1.676 m (5' 6\"), weight 114 kg (250 lb 14.1 oz), SpO2 97 %.    Relevant Results           Assessment/Plan   Active Problems:  There are no active Hospital Problems.       Lumbar radiculitis  Plan for right S1 TFESI      Elías Hinds MD    "

## 2024-02-02 NOTE — DISCHARGE INSTRUCTIONS
"Post Procedure Instructions     1. You MUST have a  to take you home and be available to assist you at home if needed, unless specifically arranged before procedure.     2. Get up from your seated or lying position slowly and carefully. It is not unusual to have some \"numbness\" in your back or legs following a lumbar injection. After a cervical injection it is not abnormal to have arm or neck numbness. The symptoms (if they occur) may last a few hours, but will wear off. Have someone assist you as you walk if numbness in your legs occurs.     3. Complete sensory block is the intent of an injection to nerves. Occasionally in trying to achieve sensory blockade you also receive a motor blockade (weak arm or leg) 4. If you receive sedation, do not drive a motorized vehicle for 24 hours.     5. Avoid ALL alcoholic beverages the day of your procedure.     6. Discuss sleep and pain medications with your prescribing physician if you received sedation.     7. It is safe to resume medications once home.     8. Tomorrow you may resume regular activities to the level your pain will tolerate. The exception is no lifting over 20 pounds for 36 hours. You will likely experience a temporary exacerbation of pre-injection pain when anesthetic medication wears off.     9. Remove your band aid tomorrow.     10. Do not take a tub bath or submerge in water for 48 hours. You may shower.     11. Apply ice to your injection site if it is swollen or hurts after the injection. Only apply 20 minutes on, then off for 20 minutes. Take the ice off as you sleep.     12. Call scheduling office at [phone] to verify a follow-up appointment 4-6 weeks after your procedure / injection with your pain diary.     13. Call your physician immediately or go to the emergency room for any of the following symptoms:    Severe pain at your injection site (tightness or aching is normal)    Pain, redness, pus, or leaking fluid at the injection site    " "Prolonged or increasing numbness 14 hours after a block    A temperature over 101.5 degrees F and / or chills    Excessive bruising, bleeding, or swelling at the injection site    Loss of bowel or bladder control or \"saddle anesthesia\"    Inability to speak, facial droop, and / or limb paralysis     Note: if you have any questions please contact our office at [phone], if the office is closed please call the after hours phone number at [phone] and ask for the pain resident on call    ESE Hinds M.D.  Medicine and Rehabilitation, Orthopedic [facility]     To schedule FOLLOW-UP appointments, please use the call center at [phone].   To schedule future PROCEDURES or for questions for the doctor please call [phone].  "

## 2024-02-05 NOTE — ADDENDUM NOTE
Encounter addended by: Amelie Buchanan RN on: 2/5/2024 2:26 PM   Actions taken: Contacts section saved, Flowsheet accepted

## 2024-02-29 DIAGNOSIS — E11.65 CONTROLLED TYPE 2 DIABETES MELLITUS WITH HYPERGLYCEMIA, WITHOUT LONG-TERM CURRENT USE OF INSULIN (MULTI): ICD-10-CM

## 2024-02-29 RX ORDER — TIRZEPATIDE 12.5 MG/.5ML
12.5 INJECTION, SOLUTION SUBCUTANEOUS
Qty: 2 ML | Refills: 3 | Status: SHIPPED | OUTPATIENT
Start: 2024-02-29 | End: 2024-04-03 | Stop reason: SDUPTHER

## 2024-02-29 RX ORDER — TIRZEPATIDE 12.5 MG/.5ML
12.5 INJECTION, SOLUTION SUBCUTANEOUS
Qty: 2 ML | Refills: 3 | Status: SHIPPED | OUTPATIENT
Start: 2024-02-29 | End: 2024-02-29 | Stop reason: SDUPTHER

## 2024-03-05 ENCOUNTER — APPOINTMENT (OUTPATIENT)
Dept: ORTHOPEDIC SURGERY | Facility: CLINIC | Age: 64
End: 2024-03-05
Payer: COMMERCIAL

## 2024-03-14 ENCOUNTER — OFFICE VISIT (OUTPATIENT)
Dept: ORTHOPEDIC SURGERY | Facility: HOSPITAL | Age: 64
End: 2024-03-14
Payer: COMMERCIAL

## 2024-03-14 DIAGNOSIS — M54.16 LUMBAR RADICULOPATHY, CHRONIC: ICD-10-CM

## 2024-03-14 DIAGNOSIS — M43.17 SPONDYLOLISTHESIS AT L5-S1 LEVEL: Primary | ICD-10-CM

## 2024-03-14 PROCEDURE — 1036F TOBACCO NON-USER: CPT | Performed by: ORTHOPAEDIC SURGERY

## 2024-03-14 PROCEDURE — 99214 OFFICE O/P EST MOD 30 MIN: CPT | Performed by: ORTHOPAEDIC SURGERY

## 2024-03-14 PROCEDURE — 1125F AMNT PAIN NOTED PAIN PRSNT: CPT | Performed by: ORTHOPAEDIC SURGERY

## 2024-03-14 PROCEDURE — 1159F MED LIST DOCD IN RCRD: CPT | Performed by: ORTHOPAEDIC SURGERY

## 2024-03-14 ASSESSMENT — PAIN SCALES - GENERAL: PAINLEVEL_OUTOF10: 3

## 2024-03-14 ASSESSMENT — PAIN DESCRIPTION - DESCRIPTORS: DESCRIPTORS: ACHING

## 2024-03-14 ASSESSMENT — PAIN - FUNCTIONAL ASSESSMENT: PAIN_FUNCTIONAL_ASSESSMENT: 0-10

## 2024-03-14 NOTE — PROGRESS NOTES
S: Darlin Ryan is a 64 y.o. year old female patient past medical history of chronic kidney disease, diabetes, obesity who is here to follow up on lumbar radiculopathy from L5-S1 isthmic spondylolisthesis with right radicular leg pain.  She was last seen in clinic back in July 6, 2023.  Today she complains of chronic low back pain as well as pain in radiating into the right buttocks with numbness and paresthesia feeling down her thighs.  Usually worse when she is standing or walking feels better when she sits down.  When I last saw her  she was referred to physical therapy as well as referral for epidural steroid injection.  She completed her course of physical therapy without any significant relief.  She has had 2 injections.  Her first injection was a right L5 transforaminal epidural steroid injection on December 8, 2023 however she states that they could not give her the usual steroid dose due to her blood sugar levels and therefore she did not feel like she got good relief.  She then had a second injection on February 2, 2024 where she had a right S1 transforaminal epidural steroid injection which she states also did not help but she states that it was injected in a different place compared to her first injection.  She otherwise denies any bowel or bladder disturbances or saddle anesthesia.  States that her pain currently is about 3 out of 10.  When I last saw her she states that it was 7 out of 10.    O:     General: Patient appears well-nourished and well-developed in no acute distress, Alert and Oriented x3, obese female  Psych: Pleasant mood and affect  HEENT: Extraocular muscles intact, pupils equal and round. Sclerae anicteric   Cardio: extremities warm and well perfused  Resp: unlabored symmetric breathing  Skin: no open wounds or rash  Musculoskeletal/Neuro Exam: Normal gait. mild tenderness to palpation along the thoracolumbar spine and paraspinal muscles. Negative straight leg raise bilaterally. Tight  hamstrings bilaterally No groin pain with ROM of the hip joints with IR and ER. Negative ERMA bilaterally.      Lower extremity     Motor: Right leg with 5 out of 5 motor strength with hip flexion, knee extension, ankle dorsiflexion plantarflexion and EHL against resistance.  Left leg with 5 out of 5 motor strength with hip flexion, knee extension, ankle dorsiflexion plantarflexion EHL against resistance  Sensation to light touch intact along L2 to S1 distribution bilaterally  2+ patella and achilles Reflex bilaterally                  Imaging:    I reviewed x-rays of the lumbar spine obtained in clinic back in July 2023. AP lateral flexion-extension views. She does have L5-S1 isthmic spondylolisthesis measuring about 10 mm that does not seem to move with flexion extension. There is disc base narrowing facet arthrosis at L5-S1 as well.     I reviewed the MRI of the lumbar spine from October 12, 2023 which shows isthmic spondylolisthesis at L5-S1 with bilateral foraminal narrowing but no significant central canal narrowing.      A/P: Darlin Ryan is a 64 y.o. year old female patient with L5-S1 isthmic spondylolisthesis with right worse than left foraminal narrowing but no canal narrowing.  She continues to have chronic low back pain and right buttock pain that is 3 out of 10.  She has undergone physical therapy as well as 2 injections.  I reviewed the images with her in clinic today both x-ray and MRI.  I discussed conservative versus surgical option.  Conservative options include continuing exercises and therapy as well as epidural steroid injection.  Perhaps they can try the L5-S1 foramen on the right with regular steroid dose if her sugar is well-controlled.  Surgical options would likely involve L5-S1 laminectomy decompression with L5-S1 TLIF to help with interbody fusion but given the large sacral slope I am concerned that L5-S1 fusion alone would fail and would like additional fixation up to L4-S1.  However  her body habitus is not ideal for lower lumbar surgery with a BMI of 40.49.  Ideally I would like her to lose weight and get her BMI down to least 35.  She would also need to help well-controlled blood sugar with hemoglobin A1c 7 or less before any type of surgical intervention.  Patient was agreeable and will work on this.  She can follow-up with me when she is ready. After our discussion, the patient articulated understanding of the plan and felt that all questions had been answered satisfactorily. The patient was pleased with the visit and very appreciative for the care rendered.    **Please excuse any errors in grammar or translation related to this dictation. Voice recognition software was utilized to prepare this document. **                  Whitney Emery MD    Department of Orthopaedic Surgery  [facility]  [email or web address] lumbar radiculopathy.  I last saw her back in July 6, 2023.

## 2024-04-03 DIAGNOSIS — E11.65 CONTROLLED TYPE 2 DIABETES MELLITUS WITH HYPERGLYCEMIA, WITHOUT LONG-TERM CURRENT USE OF INSULIN (MULTI): ICD-10-CM

## 2024-04-03 RX ORDER — TIRZEPATIDE 12.5 MG/.5ML
12.5 INJECTION, SOLUTION SUBCUTANEOUS
Qty: 2 ML | Refills: 3 | Status: SHIPPED | OUTPATIENT
Start: 2024-04-03 | End: 2024-04-22 | Stop reason: ALTCHOICE

## 2024-04-03 NOTE — TELEPHONE ENCOUNTER
Pt called stating pharmacy can't get Mounjaro. She wonder if she could try and get it thru her [facility] script her mail in?  Or should she change to ozempic?    Please call pt at [phone]

## 2024-04-11 ENCOUNTER — OFFICE VISIT (OUTPATIENT)
Dept: ORTHOPEDIC SURGERY | Facility: CLINIC | Age: 64
End: 2024-04-11
Payer: COMMERCIAL

## 2024-04-11 DIAGNOSIS — M62.830 MUSCLE SPASM OF BACK: ICD-10-CM

## 2024-04-11 DIAGNOSIS — M54.16 LUMBAR RADICULOPATHY, CHRONIC: Primary | ICD-10-CM

## 2024-04-11 DIAGNOSIS — M46.1 SACROILIITIS (CMS-HCC): ICD-10-CM

## 2024-04-11 DIAGNOSIS — M47.816 LUMBAR SPONDYLOSIS: ICD-10-CM

## 2024-04-11 PROCEDURE — 1159F MED LIST DOCD IN RCRD: CPT | Performed by: PHYSICAL MEDICINE & REHABILITATION

## 2024-04-11 PROCEDURE — 99214 OFFICE O/P EST MOD 30 MIN: CPT | Performed by: PHYSICAL MEDICINE & REHABILITATION

## 2024-04-11 PROCEDURE — 1036F TOBACCO NON-USER: CPT | Performed by: PHYSICAL MEDICINE & REHABILITATION

## 2024-04-11 PROCEDURE — 1160F RVW MEDS BY RX/DR IN RCRD: CPT | Performed by: PHYSICAL MEDICINE & REHABILITATION

## 2024-04-11 RX ORDER — METHOCARBAMOL 500 MG/1
TABLET, FILM COATED ORAL
Qty: 60 TABLET | Refills: 1 | Status: SHIPPED | OUTPATIENT
Start: 2024-04-11

## 2024-04-11 RX ORDER — PREGABALIN 50 MG/1
50 CAPSULE ORAL 2 TIMES DAILY
Qty: 60 CAPSULE | Refills: 1 | Status: SHIPPED | OUTPATIENT
Start: 2024-04-11 | End: 2024-05-11

## 2024-04-11 NOTE — PROGRESS NOTES
Follow Up Note    Today's Date:   4/11/2024     Assessment: Very pleasant 64-year-old female with worsening chronic lower back and predominantly right leg pain.  Symptoms have been intermittently severe and impact her daily activity.  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  She has known spondylolisthesis with pars fracture at L5-S1.  This appears to be grade 2 with mild dynamic instability on flexion-extension films.  -Right lumbar radiculitis-currently suspect L5   -Lumbar spondylosis with known grade 2 anterolisthesis of L5-S1 and pars fracture  -HX of CKD and DMII - Last A1C under 8.  Understands that for surgery she would need to have her hemoglobin A1c under 7.  -Overweight-advised on weight loss  -Right sacroiliitis    - s/p Right L5 transforaminal epidural steroid injection on 12/08/23 with 50% relief kicking in 2 weeks post injection still lasting.  -February 2, 2024: Right S1 transforaminal epidural steroid injection-no significant lasting benefit    -April 11, 2024 update: Will trial increase of Lyrica to 50 mg twice daily, new referral for physical therapy.  She has significant pain at the right SI joint with 3 positive provocative test on exam today-she would like to proceed with a right SI joint injection which will be done fluoroscopic guidance and therapeutic.  She will follow-up with surgical spine if her pain persists or worsens.     PLAN:  1)  Imaging/Diagnostic Studies: Extensively reviewed Dr. Bhakta's most recent note as well as last lumbar x-ray and MRI which was personally interpreted-known anterolisthesis with spondylolysis at L5-S1 and foraminal narrowing  2)  Therapy/Rehabilitation: She has completed 6 weeks of physical therapy September 2023.  Advised that she should continue her home exercise program  3)  Pharmacological Management: New Rx provided for lyrica-advised to titrate slowly potentially up to 50 mg 2 times daily.  Robaxin as needed also  provided.  Advised on over-the-counter Tylenol Lidoderm patch usage.  4)  Spine/Surgical Interventions: Right sacroiliac joint injection     - patient will need sedation given past anxiety in relation to procedure tolerance. Of not she was not affected by low dose of sedation in the past.      5)  Alternative Treatments: May consider alternative treatment options in the future including manipulation (chiropractor versus osteopathic) and/or acupuncture if patient does not obtain optimal relief with initial treatment plan.  6)  Consultations:  None at this time  7)  Follow -up: 4-6 weeks or PRN if symptoms worsen/do not improve.   8)  Future treatment considerations: further titration of Lyrica.    Patient advised of the difference between hurt and harm and advised to continue with all normal activities and exercises. Patient verbalized understanding of the above plan and was happy with the care provided.      The above clinical summary has been dictated with voice recognition software. It has not been proofread for grammatical errors, typographical mistakes, or other semantic inconsistencies.    Thank you for visiting our office today. It was our pleasure to take part in your healthcare.     Do not hesitate to call with any questions regarding your plan of care after leaving at [phone]    To clinicians, thank you very much for this kind referral. It is a privilege to partner with you in the care of your patients. My office would be delighted to assist you with any further consultations or with questions regarding the plan of care outlined. Do not hesitate to call the office or contact me directly.     Sincerely,    ESE Hinds MD  , Physical Medicine and Rehabilitation, Orthopedic Spine  [facility] [employer]  [facility] [place]        Darlin Ryan  is a 64 y.o. female who was last seen by our fellow Dr. Newman on January 9, 2024.  Recently  seen by myself on February 2, 2024 for a right S1 transforaminal epidural steroid injection.  Since the last visit the pain is the same overall.  Intermittently severe particularly with extended periods of walking such as in the grocery store.  Pain is primarily at the right buttock with radiating symptoms toward the right groin and intermittently down the right leg in an L5 or S1 pattern.  She denies any worsening neurological deficits.     TREATMENTS  IN THE LAST SIX MONTHS     Active conservative therapy  in the last six months (see below)              1. Physical therapy: Yes                                                                                   2. Home exercise program after PT: Yes                                                    3. A physician supervised home exercise program (HEP): Yes              4. Chiropractic Care:                                                                      Passive conservative therapy  in the last six months (see below)              1. NSAIDS: Yes                                                                                                         2. Prescription pain medication: Lyrica                                                            3. Acupuncture:                                                                                             4. Tens unit:      Patient denies bowel/bladder incontinence, denies fever, denies unintentional weight loss, denies clumsiness of hands, feet, or dropping things.  Denies any constitutional or myelopathic symptomatology.     ROS: Other than listed in HPI, PMHX below, the patient denies any complaint of the following: severe cough, fainting, vision or language changes, shortness of breath, chest pain, nausea, vomiting or diarrhea, rash, dysuria, seizures, excessive sweating, or bleeding problems.    I have confirmed and edited as necessary Past Medical, Past Surgical, Family, Social History and ROS as obtained by  others. No new sig. changes since last visit.       PHYSICAL EXAM:   GENERAL APPEARANCE:  Well nourished, well developed, and no apparent distress.  NEURO PSYCH: Patient oriented to person, place, Mood pleasant. Benign affect.  MUSCULOSKELETAL and NEUROLOGICAL       VISUAL INSPECTION           LUMBAR: WNL  SPINE ROM:       PALPATION:           SPINOUS PROCESS: Nontender lumbar           PARASPINALS: Tender to right lower lumbar and primarily over the right SI joint area/gluteal musculature and PSIS  FACET LOADING: Mildly positive right lower lumbar  MUSCLE BULK: Normal and symmetrical in the upper & lower extremities.  MUSCLE TONE: Normal  MOTOR: Seems functionally full in bilateral lower extremities  GAIT: Mildly antalgic but steady without the use of assistive device  PERIPHERAL JOINT ROM:   HIP ROM: Full bilaterally with no significant reproduction of groin pain  ERMA/Thigh Thrust/Compression/Guido Finger: All positive on the right    DATA REVIEW:   The below imaging studies were personally reviewed and discussed with the patient.    Medical Decision Making:  The above note constitutes a Moderate to High level of medical decision making based on past data and imaging review, new and chronic symptoms with exacerbation, change in weakness or sensation, new imaging and diagnostic studies ordered, discussion of potential interventional or surgical treatment options, acute or chronic pain that may pose a threat to bodily function.    Current Outpatient Medications on File Prior to Visit   Medication Sig Dispense Refill    betamethasone dipropionate 0.05 % cream g: Apply once a day x2 weeks, stop for one week then repeat as needed      blood sugar diagnostic (Blood Glucose Test) strip TEST GLUCOSE ONCE DAILY AS DIRECTED FOR DIABETES      CeleXA 20 mg tablet Take 1.5 tablets (30 mg) by mouth once daily.      melatonin 10 mg capsule Take 1 tablet by mouth once daily at bedtime.      methocarbamol (Robaxin) 500 mg  tablet 1-2 tabs every 8 hrs as needed for muscle spasm 60 tablet 1    pregabalin (Lyrica) 25 mg capsule Take 1 capsule (25 mg) by mouth 2 times a day. 60 capsule 2    tirzepatide (Mounjaro) 12.5 mg/0.5 mL pen injector Inject 12.5 mg under the skin 1 (one) time per week. 2 mL 3    traZODone (Desyrel) 50 mg tablet Take 1 tablet (50 mg) by mouth as needed at bedtime for sleep.       No current facility-administered medications on file prior to visit.        Past Medical History:   Diagnosis Date    Anxiety     Proteinuria 05/10/2023        Past Surgical History:   Procedure Laterality Date    CATARACT EXTRACTION Left 2023     SECTION, LOW TRANSVERSE      EYE SURGERY      OTHER SURGICAL HISTORY  2020    Dilation and curettage    OTHER SURGICAL HISTORY  2020    Colonoscopy        Allergies   Allergen Reactions    Crestor [Rosuvastatin] Other     Muscle cramps    Pravastatin Other     Sever muscles cramps.

## 2024-04-18 ENCOUNTER — LAB (OUTPATIENT)
Dept: LAB | Facility: LAB | Age: 64
End: 2024-04-18
Payer: COMMERCIAL

## 2024-04-18 DIAGNOSIS — E11.65 CONTROLLED TYPE 2 DIABETES MELLITUS WITH HYPERGLYCEMIA, WITHOUT LONG-TERM CURRENT USE OF INSULIN (MULTI): ICD-10-CM

## 2024-04-18 DIAGNOSIS — E53.8 B12 DEFICIENCY: ICD-10-CM

## 2024-04-18 LAB
ALBUMIN SERPL BCP-MCNC: 3.7 G/DL (ref 3.4–5)
ALP SERPL-CCNC: 66 U/L (ref 33–136)
ALT SERPL W P-5'-P-CCNC: 8 U/L (ref 7–45)
ANION GAP SERPL CALC-SCNC: 14 MMOL/L (ref 10–20)
AST SERPL W P-5'-P-CCNC: 7 U/L (ref 9–39)
BILIRUB SERPL-MCNC: 0.6 MG/DL (ref 0–1.2)
BUN SERPL-MCNC: 13 MG/DL (ref 6–23)
CALCIUM SERPL-MCNC: 8.9 MG/DL (ref 8.6–10.6)
CHLORIDE SERPL-SCNC: 102 MMOL/L (ref 98–107)
CHOLEST SERPL-MCNC: 188 MG/DL (ref 0–199)
CHOLESTEROL/HDL RATIO: 4.8
CO2 SERPL-SCNC: 28 MMOL/L (ref 21–32)
CREAT SERPL-MCNC: 1.07 MG/DL (ref 0.5–1.05)
CREAT UR-MCNC: 111.6 MG/DL (ref 20–320)
EGFRCR SERPLBLD CKD-EPI 2021: 58 ML/MIN/1.73M*2
ERYTHROCYTE [DISTWIDTH] IN BLOOD BY AUTOMATED COUNT: 12.8 % (ref 11.5–14.5)
EST. AVERAGE GLUCOSE BLD GHB EST-MCNC: 114 MG/DL
GLUCOSE SERPL-MCNC: 157 MG/DL (ref 74–99)
HBA1C MFR BLD: 5.6 %
HCT VFR BLD AUTO: 44.9 % (ref 36–46)
HDLC SERPL-MCNC: 39 MG/DL
HGB BLD-MCNC: 13.7 G/DL (ref 12–16)
LDLC SERPL CALC-MCNC: 125 MG/DL
MCH RBC QN AUTO: 28.5 PG (ref 26–34)
MCHC RBC AUTO-ENTMCNC: 30.5 G/DL (ref 32–36)
MCV RBC AUTO: 94 FL (ref 80–100)
MICROALBUMIN UR-MCNC: 12.4 MG/L
MICROALBUMIN/CREAT UR: 11.1 UG/MG CREAT
NON HDL CHOLESTEROL: 149 MG/DL (ref 0–149)
NRBC BLD-RTO: 0 /100 WBCS (ref 0–0)
PLATELET # BLD AUTO: 215 X10*3/UL (ref 150–450)
POTASSIUM SERPL-SCNC: 4.5 MMOL/L (ref 3.5–5.3)
PROT SERPL-MCNC: 6.7 G/DL (ref 6.4–8.2)
RBC # BLD AUTO: 4.8 X10*6/UL (ref 4–5.2)
SODIUM SERPL-SCNC: 139 MMOL/L (ref 136–145)
TRIGL SERPL-MCNC: 118 MG/DL (ref 0–149)
VIT B12 SERPL-MCNC: 400 PG/ML (ref 211–911)
VLDL: 24 MG/DL (ref 0–40)
WBC # BLD AUTO: 6.6 X10*3/UL (ref 4.4–11.3)

## 2024-04-18 PROCEDURE — 80061 LIPID PANEL: CPT

## 2024-04-18 PROCEDURE — 82043 UR ALBUMIN QUANTITATIVE: CPT

## 2024-04-18 PROCEDURE — 36415 COLL VENOUS BLD VENIPUNCTURE: CPT

## 2024-04-18 PROCEDURE — 83036 HEMOGLOBIN GLYCOSYLATED A1C: CPT

## 2024-04-18 PROCEDURE — 82607 VITAMIN B-12: CPT

## 2024-04-18 PROCEDURE — 80053 COMPREHEN METABOLIC PANEL: CPT

## 2024-04-18 PROCEDURE — 85027 COMPLETE CBC AUTOMATED: CPT

## 2024-04-18 PROCEDURE — 82570 ASSAY OF URINE CREATININE: CPT

## 2024-04-22 ENCOUNTER — OFFICE VISIT (OUTPATIENT)
Dept: PRIMARY CARE | Facility: CLINIC | Age: 64
End: 2024-04-22
Payer: COMMERCIAL

## 2024-04-22 ENCOUNTER — TELEPHONE (OUTPATIENT)
Dept: PRIMARY CARE | Facility: CLINIC | Age: 64
End: 2024-04-22

## 2024-04-22 VITALS
HEART RATE: 65 BPM | HEIGHT: 66 IN | WEIGHT: 249 LBS | SYSTOLIC BLOOD PRESSURE: 114 MMHG | TEMPERATURE: 97.4 F | DIASTOLIC BLOOD PRESSURE: 72 MMHG | OXYGEN SATURATION: 94 % | BODY MASS INDEX: 40.02 KG/M2 | RESPIRATION RATE: 16 BRPM

## 2024-04-22 DIAGNOSIS — E78.5 HYPERLIPIDEMIA, MILD: Chronic | ICD-10-CM

## 2024-04-22 DIAGNOSIS — Z78.9 STATIN INTOLERANCE: ICD-10-CM

## 2024-04-22 DIAGNOSIS — N18.31 STAGE 3A CHRONIC KIDNEY DISEASE (MULTI): ICD-10-CM

## 2024-04-22 DIAGNOSIS — E11.65 CONTROLLED TYPE 2 DIABETES MELLITUS WITH HYPERGLYCEMIA, WITHOUT LONG-TERM CURRENT USE OF INSULIN (MULTI): ICD-10-CM

## 2024-04-22 DIAGNOSIS — R10.31 RIGHT INGUINAL PAIN: ICD-10-CM

## 2024-04-22 DIAGNOSIS — E66.01 MORBID OBESITY (MULTI): Primary | ICD-10-CM

## 2024-04-22 PROCEDURE — 3049F LDL-C 100-129 MG/DL: CPT | Performed by: INTERNAL MEDICINE

## 2024-04-22 PROCEDURE — 99214 OFFICE O/P EST MOD 30 MIN: CPT | Performed by: INTERNAL MEDICINE

## 2024-04-22 PROCEDURE — 1160F RVW MEDS BY RX/DR IN RCRD: CPT | Performed by: INTERNAL MEDICINE

## 2024-04-22 PROCEDURE — 1036F TOBACCO NON-USER: CPT | Performed by: INTERNAL MEDICINE

## 2024-04-22 PROCEDURE — 1159F MED LIST DOCD IN RCRD: CPT | Performed by: INTERNAL MEDICINE

## 2024-04-22 PROCEDURE — 3074F SYST BP LT 130 MM HG: CPT | Performed by: INTERNAL MEDICINE

## 2024-04-22 PROCEDURE — 3044F HG A1C LEVEL LT 7.0%: CPT | Performed by: INTERNAL MEDICINE

## 2024-04-22 PROCEDURE — 3061F NEG MICROALBUMINURIA REV: CPT | Performed by: INTERNAL MEDICINE

## 2024-04-22 PROCEDURE — 3078F DIAST BP <80 MM HG: CPT | Performed by: INTERNAL MEDICINE

## 2024-04-22 RX ORDER — TIRZEPATIDE 10 MG/.5ML
10 INJECTION, SOLUTION SUBCUTANEOUS
Qty: 2 ML | Refills: 3 | Status: SHIPPED | OUTPATIENT
Start: 2024-04-28

## 2024-04-22 ASSESSMENT — ENCOUNTER SYMPTOMS
RHINORRHEA: 0
WHEEZING: 0
COUGH: 0
FREQUENCY: 0
HEADACHES: 0
DIZZINESS: 0
ARTHRALGIAS: 1
DYSURIA: 0
HEMATURIA: 0
NAUSEA: 0
SHORTNESS OF BREATH: 0
NERVOUS/ANXIOUS: 0
CONSTIPATION: 0
EYE PAIN: 0
PALPITATIONS: 0
ABDOMINAL PAIN: 0
BLOOD IN STOOL: 0
SORE THROAT: 0
UNEXPECTED WEIGHT CHANGE: 0
DIARRHEA: 0
VOMITING: 0
MYALGIAS: 1
DYSPHORIC MOOD: 0
CHILLS: 0
FEVER: 0
WOUND: 0
CHEST TIGHTNESS: 0
POLYPHAGIA: 0
POLYDIPSIA: 0

## 2024-04-22 ASSESSMENT — PATIENT HEALTH QUESTIONNAIRE - PHQ9
2. FEELING DOWN, DEPRESSED OR HOPELESS: NOT AT ALL
1. LITTLE INTEREST OR PLEASURE IN DOING THINGS: NOT AT ALL
SUM OF ALL RESPONSES TO PHQ9 QUESTIONS 1 AND 2: 0

## 2024-04-22 NOTE — PROGRESS NOTES
"Subjective   Patient ID: Darlin Ryan is a 64 y.o. female who presents for Follow-up (4 month follow up ).    HPI   Told kidney cyst on her MRI of her spine  She states has buttock pain and radiates internally to groin. Lyrica helps. Worsens with movement and is deep. Not able to walk much. She states has been bad. Sees Dr. Ma and Dr. Emery. Injections not helping. To get right Si joint injection next to see if works    She states out of mounjaro for 3-4 weeks as pharmacy does not have it  Sugars higher and saw a 180 without it    Worries about kidney  States pain is not good   She ran out of lyrica and does help a lot    She did her labs. Here to discuss her labs    Review of Systems   Constitutional:  Negative for chills, fever and unexpected weight change.   HENT:  Negative for congestion, hearing loss, rhinorrhea and sore throat.    Eyes:  Negative for pain and visual disturbance.   Respiratory:  Negative for cough, chest tightness, shortness of breath and wheezing.    Cardiovascular:  Negative for chest pain and palpitations.   Gastrointestinal:  Negative for abdominal pain, blood in stool, constipation, diarrhea, nausea and vomiting.   Endocrine: Negative for cold intolerance, heat intolerance, polydipsia and polyphagia.   Genitourinary:  Negative for dysuria, frequency and hematuria.   Musculoskeletal:  Positive for arthralgias and myalgias.   Skin:  Negative for rash and wound.   Neurological:  Negative for dizziness, syncope and headaches.   Psychiatric/Behavioral:  Negative for dysphoric mood. The patient is not nervous/anxious.        Objective   /72 (BP Location: Left arm, Patient Position: Sitting, BP Cuff Size: Large adult)   Pulse 65   Temp 36.3 °C (97.4 °F)   Resp 16   Ht 1.676 m (5' 6\")   Wt 113 kg (249 lb)   SpO2 94%   BMI 40.19 kg/m²     Physical Exam  Constitutional:       Appearance: Normal appearance.   Cardiovascular:      Rate and Rhythm: Normal rate and regular rhythm.      " Heart sounds: Normal heart sounds. No murmur heard.     No gallop.   Pulmonary:      Effort: Pulmonary effort is normal. No respiratory distress.      Breath sounds: Normal breath sounds.   Abdominal:      General: There is no distension.      Palpations: Abdomen is soft.      Tenderness: There is no abdominal tenderness.   Musculoskeletal:      Right lower leg: No edema.      Left lower leg: No edema.   Neurological:      Mental Status: She is alert.         Assessment/Plan   Problem List Items Addressed This Visit             ICD-10-CM    Morbid obesity (Multi) - Primary E66.01    Diabetes type 2, controlled (Multi) E11.9    Relevant Medications    tirzepatide (Mounjaro) 10 mg/0.5 mL pen injector (Start on 4/28/2024)    Hyperlipidemia, mild (Chronic) E78.5    Stage 3a chronic kidney disease (Multi) N18.31     Other Visit Diagnoses         Codes    Right inguinal pain     R10.31    Statin intolerance     Z78.9          Groin pain- to have SI joint injection soon  -if does not help-will do CT A/P to eval and rule out internal reasons  -she will update me after     Obesity:  -topamax did not help  -Phentermine helped. Down 20 pounds.   -mounjaro is on backorder  -did not have an affect with ozempic     Muscle aches: sounds like from statin  -give another 4 months off and consider lovastatin next visit, zetia or injections     TMJ issues:      New onset type 2 diabetes: a1c: 11.3--> 6.3--> 6.4--> 5.6-->6.1--> 5.3--> 5.4--> 5.8-->6.1--> 6.6--> 5.6  -worsening  -off metformin  -if unable to get lower dose of mounjaro--swap to metformin as sugars jigh  -+ urine microalbumin (1/20)--> now normal 10/22     Migraines from metformin: off topamax     Hypertension: resolved off of meds  -Lisinopril caused migraines     Proteinuria: resolved     Leukopenia: resolved     Hyperlipidemia: uncontrolled  -crestor and pravastatin caused muscle aches  -improved some     Shoulder pain and neck pain: seeing ortho  -has a tear     Known  Fatty liver:   -weight loss  -liver enzymes normalized     Depression  -follows with Dr. Hilda Lockwood ([place])--has not seen  -controlled on celexa and desyrel and prn vistaril  -if stable, I can prescribe. If worsens back to psychiatry     Chronic back pain: herniated disc, DDD, and sciatica  -she has had injections  -can use salon past patches, voltaren gel  -seeing pain mgmt  -did injection  -on lyrica  -to have SI joint injection    Hx of lichen planus: follows with dermatology--Dr. Drake     Venous insufficiency: compression stockings, elevate legs     B12 deficiency: cont b12 pills      4 months f/u with labs before

## 2024-05-03 ENCOUNTER — HOSPITAL ENCOUNTER (OUTPATIENT)
Dept: OPERATING ROOM | Facility: CLINIC | Age: 64
Setting detail: OUTPATIENT SURGERY
Discharge: HOME | End: 2024-05-03
Payer: MEDICARE

## 2024-05-03 ENCOUNTER — TELEPHONE (OUTPATIENT)
Dept: PRIMARY CARE | Facility: CLINIC | Age: 64
End: 2024-05-03
Payer: COMMERCIAL

## 2024-05-03 VITALS
BODY MASS INDEX: 40.21 KG/M2 | HEART RATE: 64 BPM | WEIGHT: 250.22 LBS | SYSTOLIC BLOOD PRESSURE: 157 MMHG | HEIGHT: 66 IN | DIASTOLIC BLOOD PRESSURE: 73 MMHG | RESPIRATION RATE: 16 BRPM | TEMPERATURE: 97 F

## 2024-05-03 DIAGNOSIS — E11.65 CONTROLLED TYPE 2 DIABETES MELLITUS WITH HYPERGLYCEMIA, WITHOUT LONG-TERM CURRENT USE OF INSULIN (MULTI): Primary | ICD-10-CM

## 2024-05-03 DIAGNOSIS — M46.1 SACROILIITIS (CMS-HCC): ICD-10-CM

## 2024-05-03 LAB
GLUCOSE BLD MANUAL STRIP-MCNC: 299 MG/DL (ref 74–99)
GLUCOSE BLD MANUAL STRIP-MCNC: 309 MG/DL (ref 74–99)

## 2024-05-03 PROCEDURE — 82947 ASSAY GLUCOSE BLOOD QUANT: CPT

## 2024-05-03 RX ORDER — METFORMIN HYDROCHLORIDE 500 MG/1
500 TABLET ORAL
Qty: 200 TABLET | Refills: 0 | Status: SHIPPED | OUTPATIENT
Start: 2024-05-03 | End: 2025-06-07

## 2024-05-03 ASSESSMENT — COLUMBIA-SUICIDE SEVERITY RATING SCALE - C-SSRS
1. IN THE PAST MONTH, HAVE YOU WISHED YOU WERE DEAD OR WISHED YOU COULD GO TO SLEEP AND NOT WAKE UP?: NO
6. HAVE YOU EVER DONE ANYTHING, STARTED TO DO ANYTHING, OR PREPARED TO DO ANYTHING TO END YOUR LIFE?: NO
2. HAVE YOU ACTUALLY HAD ANY THOUGHTS OF KILLING YOURSELF?: NO

## 2024-05-03 ASSESSMENT — PAIN - FUNCTIONAL ASSESSMENT: PAIN_FUNCTIONAL_ASSESSMENT: 0-10

## 2024-05-03 NOTE — TELEPHONE ENCOUNTER
Pt stopped in to get message to Dr. Odonnell:    I was denied shot for back today due to A1C. It read 300. Pt has not taking Mounjaro, she stated she usually takes it on Sunday. They can't do procedure due to A1C would have raise it to 400.  Dr. Hinds was wonder if she could be put on something for fast acting.    Pharmacy=  [facility] in [place]    Please call pt at  [phone]

## 2024-05-06 ENCOUNTER — TELEPHONE (OUTPATIENT)
Dept: PRIMARY CARE | Facility: CLINIC | Age: 64
End: 2024-05-06
Payer: COMMERCIAL

## 2024-05-06 NOTE — TELEPHONE ENCOUNTER
Pt called to get message to Dr. Odonnell    Theres been an issue getting on Mounjaro,  She stated her glucose today was 170    She stated they called into wrong pharmacy.  [facility], can she cancel that order? Or does the doctor need to?    She stated her numbers are dropping, so she doesn't need the medication.    Pt stated this was to be sent to local over the weekend.    Please call pt at  [phone]

## 2024-05-08 ENCOUNTER — HOSPITAL ENCOUNTER (OUTPATIENT)
Dept: NEUROLOGY | Facility: HOSPITAL | Age: 64
Discharge: HOME | End: 2024-05-08
Payer: MEDICARE

## 2024-05-08 DIAGNOSIS — M54.16 LUMBAR RADICULOPATHY, CHRONIC: ICD-10-CM

## 2024-05-08 PROCEDURE — 95886 MUSC TEST DONE W/N TEST COMP: CPT | Performed by: STUDENT IN AN ORGANIZED HEALTH CARE EDUCATION/TRAINING PROGRAM

## 2024-05-08 PROCEDURE — 95908 NRV CNDJ TST 3-4 STUDIES: CPT | Performed by: STUDENT IN AN ORGANIZED HEALTH CARE EDUCATION/TRAINING PROGRAM

## 2024-05-10 DIAGNOSIS — M46.1 SACROILIITIS (CMS-HCC): ICD-10-CM

## 2024-05-15 ENCOUNTER — HOSPITAL ENCOUNTER (OUTPATIENT)
Dept: OPERATING ROOM | Facility: CLINIC | Age: 64
Setting detail: OUTPATIENT SURGERY
Discharge: HOME | End: 2024-05-15
Payer: COMMERCIAL

## 2024-05-15 ENCOUNTER — HOSPITAL ENCOUNTER (OUTPATIENT)
Dept: RADIOLOGY | Facility: CLINIC | Age: 64
Setting detail: OUTPATIENT SURGERY
Discharge: HOME | End: 2024-05-15
Payer: COMMERCIAL

## 2024-05-15 VITALS
HEART RATE: 63 BPM | RESPIRATION RATE: 16 BRPM | OXYGEN SATURATION: 98 % | DIASTOLIC BLOOD PRESSURE: 63 MMHG | SYSTOLIC BLOOD PRESSURE: 134 MMHG | TEMPERATURE: 97.7 F

## 2024-05-15 DIAGNOSIS — M46.1 SACROILIITIS (CMS-HCC): ICD-10-CM

## 2024-05-15 LAB — GLUCOSE BLD MANUAL STRIP-MCNC: 99 MG/DL (ref 74–99)

## 2024-05-15 PROCEDURE — 2550000001 HC RX 255 CONTRASTS: Performed by: PHYSICAL MEDICINE & REHABILITATION

## 2024-05-15 PROCEDURE — 27096 INJECT SACROILIAC JOINT: CPT | Mod: RT | Performed by: PHYSICAL MEDICINE & REHABILITATION

## 2024-05-15 PROCEDURE — 27096 INJECT SACROILIAC JOINT: CPT | Performed by: PHYSICAL MEDICINE & REHABILITATION

## 2024-05-15 PROCEDURE — 7100000010 HC PHASE TWO TIME - EACH INCREMENTAL 1 MINUTE

## 2024-05-15 PROCEDURE — 7100000009 HC PHASE TWO TIME - INITIAL BASE CHARGE

## 2024-05-15 PROCEDURE — 2500000005 HC RX 250 GENERAL PHARMACY W/O HCPCS: Performed by: PHYSICAL MEDICINE & REHABILITATION

## 2024-05-15 PROCEDURE — 3600000001 HC OR TIME - INITIAL BASE CHARGE - PROCEDURE LEVEL ONE

## 2024-05-15 PROCEDURE — 3600000006 HC OR TIME - EACH INCREMENTAL 1 MINUTE - PROCEDURE LEVEL ONE

## 2024-05-15 PROCEDURE — 2500000004 HC RX 250 GENERAL PHARMACY W/ HCPCS (ALT 636 FOR OP/ED): Performed by: PHYSICAL MEDICINE & REHABILITATION

## 2024-05-15 PROCEDURE — 82947 ASSAY GLUCOSE BLOOD QUANT: CPT

## 2024-05-15 RX ORDER — SODIUM BICARBONATE 42 MG/ML
INJECTION, SOLUTION INTRAVENOUS AS NEEDED
Status: COMPLETED | OUTPATIENT
Start: 2024-05-15 | End: 2024-05-15

## 2024-05-15 RX ORDER — LIDOCAINE HYDROCHLORIDE 10 MG/ML
INJECTION INFILTRATION; PERINEURAL AS NEEDED
Status: COMPLETED | OUTPATIENT
Start: 2024-05-15 | End: 2024-05-15

## 2024-05-15 RX ORDER — METHYLPREDNISOLONE ACETATE 40 MG/ML
INJECTION, SUSPENSION INTRA-ARTICULAR; INTRALESIONAL; INTRAMUSCULAR; SOFT TISSUE AS NEEDED
Status: COMPLETED | OUTPATIENT
Start: 2024-05-15 | End: 2024-05-15

## 2024-05-15 RX ADMIN — LIDOCAINE HYDROCHLORIDE 10 ML: 10 INJECTION, SOLUTION INFILTRATION; PERINEURAL at 14:21

## 2024-05-15 RX ADMIN — METHYLPREDNISOLONE ACETATE 40 MG: 40 INJECTION, SUSPENSION INTRA-ARTICULAR; INTRALESIONAL; INTRAMUSCULAR; SOFT TISSUE at 14:10

## 2024-05-15 RX ADMIN — IOHEXOL 5 ML: 240 INJECTION, SOLUTION INTRATHECAL; INTRAVASCULAR; INTRAVENOUS; ORAL at 14:10

## 2024-05-15 RX ADMIN — SODIUM BICARBONATE 0.5 MEQ: 42 INJECTION, SOLUTION INTRAVENOUS at 14:10

## 2024-05-15 ASSESSMENT — ENCOUNTER SYMPTOMS
FEVER: 0
ABDOMINAL PAIN: 0
BACK PAIN: 1
CHEST TIGHTNESS: 0
ARTHRALGIAS: 1
AGITATION: 0
NAUSEA: 0
SHORTNESS OF BREATH: 0
APNEA: 0
CHILLS: 0

## 2024-05-15 ASSESSMENT — PAIN - FUNCTIONAL ASSESSMENT
PAIN_FUNCTIONAL_ASSESSMENT: 0-10
PAIN_FUNCTIONAL_ASSESSMENT: 0-10

## 2024-05-15 ASSESSMENT — PAIN SCALES - GENERAL
PAINLEVEL_OUTOF10: 0 - NO PAIN
PAINLEVEL_OUTOF10: 1

## 2024-05-15 NOTE — OP NOTE
* No procedures listed * Operative Note     Date: 5/15/2024  OR Location: [facility] OR    Name: Darlin Ryan, : 1960, Age: 64 y.o., MRN: [id number], Sex: female      Staff:   Circulator: Jerry Kuhn RN  Scrub Person: Edelmira Zelaya RN    The patient was seen in the preoperative area. The risks, benefits, complications, treatment options, non-operative alternatives, expected recovery and outcomes were discussed with the patient. The possibilities of reaction to medication, pulmonary aspiration, injury to surrounding structures, bleeding, recurrent infection, the need for additional procedures, failure to diagnose a condition, and creating a complication requiring transfusion or operation were discussed with the patient. The patient concurred with the proposed plan, giving informed consent.  The site of surgery was properly noted/marked if necessary per policy.     Procedure(s):Operation: Right Sacro-Iliac Joint Injection     Pre-Op/Pre-Procedure Diagnosis: Sacroiliitis  Post-Op Diagnosis: same    Anesthesia: Local only     Fluoroscopy time: See documented     Estimated Blood Loss: None  Specimens: None  Drains: None  Complications: None     INDICATIONS: The patient has been referred by my colleague with concordant subjective, objective, and radiologic findings of Sacroiliitis, referred for diagnostic and therapeutic Sacro-Iliac Joint injection(s) with failure of prior conservative care with physical therapy and medications alone. At this time, the patient wishes to avoid surgery.      PROCEDURE: After obtaining both verbal and written informed consent, the patient was placed in a prone position on the fluoroscopic table in procedure room, the patient's posterior lumbosacral spine was prepped and draped in usual sterile fashion using chlorhexidine. The patient was connected to noninvasive blood pressure, EKG, pulse oximetry monitoring, and monitored by a registered interventional nurse throughout the  "procedure. Before initiating procedure, all relevant information was verified in a \"time-out.\"      Skin wheal(s) were raised using 1% preservative-free lidocaine near the inferior portion of the Sacro-iliac joint (s). Through the skin wheal a 22-gauge, 3-1/2-inch curved Quincke-tip spinal needle was inserted and advanced under direct fluoroscopic visualization in the AP and lateral planes, until the needle tip entered the Sacro-iliac Joint. Proper needle placement was confirmed with 0.2 cc of Omnipaque-180M nonionic contrast confirming intra-articular flow of contrast without any intravascular uptake of contrast seen under direct fluoroscopic visualization in the AP, ipsilateral oblique, and lateral planes. At this point 40mg Depomedrol and 1 cc of 1.00% preservative- free Lidocaine were infused into the Sacro-Iliac Joint(s). Adequate hemostasis was obtained at the needle puncture site. The patient's back was cleaned and a sterile dressing was applied. The patient was taken conscious and in stable condition to the recovery room. No complications as a result of this procedure. Post procedure precautions and instructions were reviewed with the patient who verbalized understanding.      Significant Findings: None  Pre-Op Pain: 9/10  Post-Op Pain: 0/10  The patient had at least 3/5 positive provocative test on physical exam prior to injection.  The patient had no to minimal pain with the same provocative test after injection.     Care Instructions: Discharge per protocol.  Medications: See medication section  Appointment: Patient to return 2-3 weeks to clinic with pain diary.  Discharge Condition: Good condition for discharge.  Patient discharged home when all discharge criterion met.      Attending Attestation: I performed the procedure.    *No primary surgeon found*      "

## 2024-05-15 NOTE — DISCHARGE INSTRUCTIONS
"Post Procedure Instructions     1. You MUST have a  to take you home and be available to assist you at home if needed, unless specifically arranged before procedure.     2. Get up from your seated or lying position slowly and carefully. It is not unusual to have some \"numbness\" in your back or legs following a lumbar injection. After a cervical injection it is not abnormal to have arm or neck numbness. The symptoms (if they occur) may last a few hours, but will wear off. Have someone assist you as you walk if numbness in your legs occurs.     3. Complete sensory block is the intent of an injection to nerves. Occasionally in trying to achieve sensory blockade you also receive a motor blockade (weak arm or leg) 4. If you receive sedation, do not drive a motorized vehicle for 24 hours.     5. Avoid ALL alcoholic beverages the day of your procedure.     6. Discuss sleep and pain medications with your prescribing physician if you received sedation.     7. It is safe to resume medications once home.     8. Tomorrow you may resume regular activities to the level your pain will tolerate. The exception is no lifting over 20 pounds for 36 hours. You will likely experience a temporary exacerbation of pre-injection pain when anesthetic medication wears off.     9. Remove your band aid tomorrow.     10. Do not take a tub bath or submerge in water for 48 hours. You may shower.     11. Apply ice to your injection site if it is swollen or hurts after the injection. Only apply 20 minutes on, then off for 20 minutes. Take the ice off as you sleep.     12. Call scheduling office at [phone] to verify a follow-up appointment 4-6 weeks after your procedure / injection with your pain diary.     13. Call your physician immediately or go to the emergency room for any of the following symptoms:    Severe pain at your injection site (tightness or aching is normal)    Pain, redness, pus, or leaking fluid at the injection site    " "Prolonged or increasing numbness 14 hours after a block    A temperature over 101.5 degrees F and / or chills    Excessive bruising, bleeding, or swelling at the injection site    Loss of bowel or bladder control or \"saddle anesthesia\"    Inability to speak, facial droop, and / or limb paralysis     Note: if you have any questions please contact our office at [phone], if the office is closed please call the after hours phone number at [phone] and ask for the pain resident on call    ESE Hinds M.D.  Medicine and Rehabilitation, Orthopedic [facility]     To schedule FOLLOW-UP appointments, please use the call center at [phone].   To schedule future PROCEDURES or for questions for the doctor please call [phone].   "

## 2024-05-15 NOTE — H&P
History Of Present Illness  Darlin Ryan is a 64 y.o. female presenting with chronic right SI joint pain refractory to conservative treatment, achy and sharp, worse with activity, better at rest, trying to avoid surgery.   Pain can be a 8-9/10     Past Medical History  Past Medical History:   Diagnosis Date    Anxiety     CKD (chronic kidney disease)     Proteinuria 05/10/2023    Type 2 diabetes mellitus (Multi)        Surgical History  Past Surgical History:   Procedure Laterality Date    CATARACT EXTRACTION Left 2023     SECTION, LOW TRANSVERSE      EYE SURGERY      OTHER SURGICAL HISTORY  2020    Dilation and curettage    OTHER SURGICAL HISTORY  2020    Colonoscopy        Social History  She reports that she quit smoking about 20 years ago. Her smoking use included cigarettes. She started smoking about 50 years ago. She has never used smokeless tobacco. She reports that she does not currently use alcohol. She reports that she does not use drugs.    Family History  Family History   Problem Relation Name Age of Onset    Cancer Mother          bone cancer, lung cancer    Aneurysm Father          Allergies  Crestor [rosuvastatin] and Pravastatin    Review of Systems   Constitutional:  Negative for chills and fever.   Respiratory:  Negative for apnea, chest tightness and shortness of breath.    Cardiovascular:  Negative for chest pain.   Gastrointestinal:  Negative for abdominal pain and nausea.   Musculoskeletal:  Positive for arthralgias and back pain.   Psychiatric/Behavioral:  Negative for agitation.    All other systems reviewed and are negative.       Physical Exam  Vitals reviewed.   Constitutional:       Appearance: Normal appearance.   HENT:      Head: Atraumatic.   Pulmonary:      Effort: Pulmonary effort is normal.      Breath sounds: Normal breath sounds.   Neurological:      Mental Status: She is alert and oriented to person, place, and time. Mental status is at baseline.    Psychiatric:         Mood and Affect: Mood normal.         Behavior: Behavior is cooperative.          Last Recorded Vitals  Blood pressure 136/65, pulse 65, temperature 36 °C (96.8 °F), temperature source Temporal, resp. rate 16, SpO2 97%.    Relevant Results         Assessment/Plan   Active Problems:  There are no active Hospital Problems.    Right Sacroiliitis  Plan for right SI Joint injection      Elías Hinds MD

## 2024-05-23 ENCOUNTER — APPOINTMENT (OUTPATIENT)
Dept: PHYSICAL THERAPY | Facility: CLINIC | Age: 64
End: 2024-05-23
Payer: MEDICARE

## 2024-07-15 ENCOUNTER — TELEPHONE (OUTPATIENT)
Dept: PRIMARY CARE | Facility: CLINIC | Age: 64
End: 2024-07-15
Payer: COMMERCIAL

## 2024-07-15 DIAGNOSIS — M48.061 SPINAL STENOSIS OF LUMBAR REGION, UNSPECIFIED WHETHER NEUROGENIC CLAUDICATION PRESENT: Primary | ICD-10-CM

## 2024-07-15 NOTE — TELEPHONE ENCOUNTER
Spoke with pt  She does want to see someone for a second opinion   Advised the order would be put in

## 2024-07-15 NOTE — TELEPHONE ENCOUNTER
Pt called had a question Dr. Belle CHAVEZ    Records will show that everything was done for sciatic problem getting worse, had shot in back, saw surgeon , had mri, didn't know what else she can do?    Is there a place to burn the nerves off?    Can't sit in chair anymore been going on for a year    Please call pt at [phone]

## 2024-07-15 NOTE — TELEPHONE ENCOUNTER
Spoke with pt. She advises Dr Ma and Yony have both been treating her for this but she is not any better, she's having trouble sitting and walking, pain in left shoulder   Was told surgery is not an option due to her weight and diabetes   She is very frustrated since she has had three shots in her back but nothing seems to help   Wants to know if there are any other options specifically nerve ablation

## 2024-07-29 ENCOUNTER — OFFICE VISIT (OUTPATIENT)
Dept: PAIN MEDICINE | Facility: CLINIC | Age: 64
End: 2024-07-29
Payer: MEDICARE

## 2024-07-29 VITALS
TEMPERATURE: 97.5 F | OXYGEN SATURATION: 98 % | DIASTOLIC BLOOD PRESSURE: 69 MMHG | HEART RATE: 64 BPM | RESPIRATION RATE: 16 BRPM | SYSTOLIC BLOOD PRESSURE: 155 MMHG

## 2024-07-29 DIAGNOSIS — M47.816 LUMBAR SPONDYLOSIS: Primary | ICD-10-CM

## 2024-07-29 PROCEDURE — 3077F SYST BP >= 140 MM HG: CPT | Performed by: PAIN MEDICINE

## 2024-07-29 PROCEDURE — 3044F HG A1C LEVEL LT 7.0%: CPT | Performed by: PAIN MEDICINE

## 2024-07-29 PROCEDURE — 1159F MED LIST DOCD IN RCRD: CPT | Performed by: PAIN MEDICINE

## 2024-07-29 PROCEDURE — 3061F NEG MICROALBUMINURIA REV: CPT | Performed by: PAIN MEDICINE

## 2024-07-29 PROCEDURE — 3049F LDL-C 100-129 MG/DL: CPT | Performed by: PAIN MEDICINE

## 2024-07-29 PROCEDURE — G2211 COMPLEX E/M VISIT ADD ON: HCPCS | Performed by: PAIN MEDICINE

## 2024-07-29 PROCEDURE — 99204 OFFICE O/P NEW MOD 45 MIN: CPT | Performed by: PAIN MEDICINE

## 2024-07-29 PROCEDURE — 99214 OFFICE O/P EST MOD 30 MIN: CPT | Performed by: PAIN MEDICINE

## 2024-07-29 PROCEDURE — 3078F DIAST BP <80 MM HG: CPT | Performed by: PAIN MEDICINE

## 2024-07-29 ASSESSMENT — PAIN SCALES - GENERAL
PAINLEVEL: 5
PAINLEVEL_OUTOF10: 5 - MODERATE PAIN

## 2024-07-29 ASSESSMENT — COLUMBIA-SUICIDE SEVERITY RATING SCALE - C-SSRS
1. IN THE PAST MONTH, HAVE YOU WISHED YOU WERE DEAD OR WISHED YOU COULD GO TO SLEEP AND NOT WAKE UP?: NO
2. HAVE YOU ACTUALLY HAD ANY THOUGHTS OF KILLING YOURSELF?: NO
6. HAVE YOU EVER DONE ANYTHING, STARTED TO DO ANYTHING, OR PREPARED TO DO ANYTHING TO END YOUR LIFE?: NO

## 2024-07-29 ASSESSMENT — PAIN - FUNCTIONAL ASSESSMENT: PAIN_FUNCTIONAL_ASSESSMENT: 0-10

## 2024-07-29 NOTE — H&P
History Of Present Illness  Darlin Ryan is a 64 y.o. female presenting with Right side back pain for over 1 yr..were seeing DR. Pelaez with no relief. Multiple injections, EMG test and PT   Patient is confirming that this condition has been going on for at least a year she was seen and evaluated by Dr. Bhakta who did not believe any surgery was indicated was followed by Dr. Ma who provided the patient so far with 3 intervention the last 1 was on May 3, 2024 in the form of a sacroiliac joint injection the patient believes that injection helped her the most but she is continuing to describe the pain localized in the lower lumbar spine area aggravated by sitting for too long or standing too long which brings her pain to a level of 10 out of 10 currently she is rating her pain at a level of 5 out of 10 described as a sharp pain and throbbing sensation that get worse the longer she sits or she stands.  Completed the physical therapy without any significant improvement she continues to be on the Lyrica and the methocarbamol.  She is also supplementing on Advil on as-needed basis with no significant improvement in her symptoms     Past Medical History  Past Medical History:   Diagnosis Date    Anxiety     CKD (chronic kidney disease)     Proteinuria 05/10/2023    Type 2 diabetes mellitus (Multi)      Surgical History  Past Surgical History:   Procedure Laterality Date    CATARACT EXTRACTION Left 2023     SECTION, LOW TRANSVERSE      EYE SURGERY      OTHER SURGICAL HISTORY  2020    Dilation and curettage    OTHER SURGICAL HISTORY  2020    Colonoscopy     Social History  She reports that she quit smoking about 20 years ago. Her smoking use included cigarettes. She started smoking about 50 years ago. She has never used smokeless tobacco. She reports that she does not currently use alcohol. She reports that she does not use drugs.    Family History  Family History   Problem Relation Name Age of  Onset    Cancer Mother          bone cancer, lung cancer    Aneurysm Father          Allergies  Allergies   Allergen Reactions    Crestor [Rosuvastatin] Other     Muscle cramps    Pravastatin Other     Sever muscles cramps.     Review of Systems   12 Systems have been reviewed as follows.   Constitutional: Fever, weight gain, weight loss, appetite change, night sweats, fatigue, chills.  Eyes : blurry, double vision, vision, loss, tearing, redness, pain, sensitivity to light, glaucoma.  Ears, nose, mouth, and throat: Hearing loss, ringing in the ears, ear pain, nasal congestion, nasal drainage, nosebleeds, mouth, throat, irritation tooth problem.  Cardiovascular :chest pain, pressure, heart tracing,palpaitations , sweating, leg swelling, high or low blood pressure  Pulmonary: Cough, yellow or green sputum, blood and sputum, shortness of breath, wheezing  Gastrointestinal: Nause, vomiting, diarrhea, constipation, pain, blood in stool, or vomitus, heartburn, difficulty swallowing  Genitourinary: incontinence, abnormal bleeding, abnormal discharge, urinary frequency, urinary hesitancy, pain, impotence sexual problem, infection, urinary retention  Musculoskeletal: Pain, stiffness, joint, redness or warmth, arthritis, back pain, weakness, muscle wasting, sprain or fracture  Neuro: Weight weakness, dizziness, change in voice, change in taste change in vision, change in hearing, loss, or change of sensation, trouble walking, balance problems coordination problems, shaking, speech problem  Endocrine , cold or heat intolerance, blood sugar problem, weight gain or loss missed periods hot flashes, sweats, change in body hair, change in libido, increased thirst, increased urination  Heme/lymph: Swelling, bleeding, problem anemia, bruising, enlarged lymph nodes  Allergic/immunologic: H. plus nasal drip, watery itchy eyes, nasal drainage, immunosuppressed  The above, were reviewed and noted negative except as noted.    Physical  Exam   Vital signs reviewed, documented in chart     General:  Appears well, does not look in any major distress  Alert    HEENT:  Head atraumatic  Eyes normal inspection  PERRL  Normal ENT inspection  No signs of dehydration    NECK:  Normal inspection  Range of motion within normal     RESPIRATORY:  No respiratory distress    CVS:  Heart rate and rhythm regular    ABDOMEN/GI  Soft  Non-tender  No distention  No organomegaly      BACK:  Normal inspection, flexion and extension within normal limit   Positive  tenderness upon the palpation of the facet joint  Si joints  tender to palpations on the right      EXTREMITIES:  Non-Tender  Full ROM  Normal appearance  No Pedal edema  Power symmetrical , sensory examination preserved.    NEURO:  Alert and oriented X 3  CNS normal as tested without focal neurological deficit   Sensation normal  Motor normal  reflexes normal    PSYCH:  Mood normal  Affect normal    SKIN:  Color normal  No rash  Warm  Dry  no sign of skin marking supportive of IV drug usage /abuse.    Last Recorded Vitals  Blood pressure 155/69, pulse 64, temperature 36.4 °C (97.5 °F), resp. rate 16, SpO2 98%.  MR lumbar spine wo IV contrast    Result Date: 10/25/2023  Interpreted By:  Horacio Mims, STUDY: MR LUMBAR SPINE WO IV CONTRAST;  10/25/2023 9:57 am   INDICATION: Signs/Symptoms:chronic lower back and right leg pain despite over 6 wks of conservative treatment. May   COMPARISON: None.   ACCESSION NUMBER(S): [id number]   ORDERING CLINICIAN: SADIA MANZANO   TECHNIQUE: The lumbar spine was studied in the sagital, axial and coronal planes utiliing T1 and T2 weighted images.   FINDINGS: The marrow signal and vertebral body height are normal. The conus and sacrum are normal. Images at each interspace reveal the following: L1/L2 There is normal alignment and vertebral body height. The disc space is normal. There is no evidence of canal or foraminal narrowing. There is no evidence of bulging or  herniated disc. L2/L3 There is normal alignment and vertebral body height. The disc space is normal. There is no evidence of canal or foraminal narrowing. There is no evidence of bulging or herniated disc. L3/L4 There is normal alignment and vertebral body height. The disc space is normal. There is no evidence of canal or foraminal narrowing. There is no evidence of bulging or herniated disc. L4/L5 There is normal alignment and vertebral body height. The disc space is normal. There is no evidence of canal or foraminal narrowing. There is no evidence of bulging or herniated disc. L5/S1 Grade 1 spondylolisthesis and bilateral spondylolysis. Trace pseudo bulging of the intervertebral disc. No measurable central canal stenosis. Moderate/advanced bilateral foraminal narrowing without focal disc herniation.         * Spondylolysis and spondylolisthesis at L5/S1 with bilateral foraminal narrowing *No measurable canal stenosis or focal disc herniation   The examination was interpreted [facility]   Signed by: Horacio Mims 10/25/2023 10:10 AM Dictation workstation:   [id number]     Narrative & Impression   IMPRESSION:  Impression:     This is a borderline abnormal study.  There is electrophysiologic evidence suggestive of a mild, active and chronic, right L5 radiculopathy.  In addition, there is no electrophysiologic evidence of lumbosacral plexopathy, peroneal neuropathy or   peripheral neuropathy in the right lower extremity.        Douglas Del Castillo, DO     Assessment/Plan   64 years old with history and physical examination supportive of lumbago lumbar spondylosis tried and failed conservative management with physical therapy and nonsteroidal anti-inflammatory    Plan  Discussed with the patient the different modalities available for the treatment of her condition I would recommend for her a diagnostic medial nerve branch block to be performed under fluoroscopic guidance targeting the L4 for L5 L5-S1  level bilaterally if the patient described positive response to the block performed under fluoroscopic guidance then we will proceed with the denervation with a radiofrequency ablation of the medial nerve branches bilaterally at the L4-L5 L5-S1 patient verbalized understanding and agreement with the plan benefits and risk were discussed and she would like to proceed      The above clinical summary has been dictated with voice recognition software. It has not been proofread for grammatical errors, typographical mistakes, or other semantic inconsistencies.    Thank you for visiting our office today. It was our pleasure to take part in your healthcare.     Please do not hesitate to contact the pain clinic after your visit with any questions or concerns at  M-F 8-4 pm       Vance Kahn M.D.  Medical Director , Division of Pain Medicine [facility]   of Anesthesiology and Pain Medicine  [employer] [place] [address]     Office: [phone]  Fax: [phone]      Vance Kahn MD

## 2024-07-29 NOTE — PROGRESS NOTES
Right side sciatic pain for over 1 yr..were seeing DR. Pelaez with no relief. Multiple injections, EMG test and PT

## 2024-08-12 ENCOUNTER — APPOINTMENT (OUTPATIENT)
Dept: PAIN MEDICINE | Facility: CLINIC | Age: 64
End: 2024-08-12

## 2024-08-13 ENCOUNTER — HOSPITAL ENCOUNTER (OUTPATIENT)
Dept: PAIN MEDICINE | Facility: CLINIC | Age: 64
Discharge: HOME | End: 2024-08-13
Payer: MEDICARE

## 2024-08-13 VITALS
TEMPERATURE: 97.3 F | HEART RATE: 62 BPM | RESPIRATION RATE: 20 BRPM | OXYGEN SATURATION: 95 % | DIASTOLIC BLOOD PRESSURE: 69 MMHG | SYSTOLIC BLOOD PRESSURE: 155 MMHG

## 2024-08-13 DIAGNOSIS — M47.816 LUMBAR SPONDYLOSIS: ICD-10-CM

## 2024-08-13 PROCEDURE — 64494 INJ PARAVERT F JNT L/S 2 LEV: CPT | Mod: 50 | Performed by: PAIN MEDICINE

## 2024-08-13 PROCEDURE — 64493 INJ PARAVERT F JNT L/S 1 LEV: CPT | Performed by: PAIN MEDICINE

## 2024-08-13 PROCEDURE — 64494 INJ PARAVERT F JNT L/S 2 LEV: CPT | Performed by: PAIN MEDICINE

## 2024-08-13 PROCEDURE — 64493 INJ PARAVERT F JNT L/S 1 LEV: CPT | Mod: 50 | Performed by: PAIN MEDICINE

## 2024-08-13 PROCEDURE — 2500000005 HC RX 250 GENERAL PHARMACY W/O HCPCS: Performed by: PAIN MEDICINE

## 2024-08-13 RX ORDER — LIDOCAINE HYDROCHLORIDE 10 MG/ML
INJECTION, SOLUTION EPIDURAL; INFILTRATION; INTRACAUDAL; PERINEURAL AS NEEDED
Status: DISCONTINUED | OUTPATIENT
Start: 2024-08-13 | End: 2024-08-14 | Stop reason: HOSPADM

## 2024-08-13 RX ORDER — LIDOCAINE HYDROCHLORIDE 20 MG/ML
INJECTION, SOLUTION EPIDURAL; INFILTRATION; INTRACAUDAL; PERINEURAL AS NEEDED
Status: DISCONTINUED | OUTPATIENT
Start: 2024-08-13 | End: 2024-08-14 | Stop reason: HOSPADM

## 2024-08-13 ASSESSMENT — PAIN DESCRIPTION - DESCRIPTORS: DESCRIPTORS: ACHING;DULL;TINGLING

## 2024-08-13 ASSESSMENT — PAIN - FUNCTIONAL ASSESSMENT: PAIN_FUNCTIONAL_ASSESSMENT: 0-10

## 2024-08-13 ASSESSMENT — PAIN SCALES - GENERAL: PAINLEVEL_OUTOF10: 2

## 2024-08-13 NOTE — DISCHARGE INSTRUCTIONS
Post-injection instructions FOR FACET BLOCK      Pay attention to how much pain relief (what percentage compared to before the procedure) you get and for how long it lasts.     THIS IS A TEMPORARY NUMBING OF PAIN THIS IS BEING USED AS A DIAGNOSTIC INDICATOR IF THIS IS THE SOURCE OF YOUR PAIN    Activity:  RETURN TO NORMAL ACTIVITY  SEE IF YOU CAN APPRECIATE AN IMPROVEMENT IN THE PAIN    Bandages: Remove after 24 hours     Showering/Bathing: You may shower after bandage is removed     Follow up: CALL OFFICE NEXT BUSINESS [phone] LEAVE MESSAGE ABOUT THE % OF RELIEF THAT WAS OBTAINED AND FOR HOW MANY HOURS      Call the OFFICE immediately: if you notice:     Excessive bleeding from procedure site (brisk bright red bleeding from the site or bleeding that soaks the bandages or does not stop)   Severe headache  Inability to walk, leg or arm weakness or numbness that is worse after the procedure   Uncontrolled pain   New urinary or fecal incontinence   Signs of infection: Fever above 101.5F, redness, swelling, pus or drainage from the site

## 2024-08-14 ENCOUNTER — TELEPHONE (OUTPATIENT)
Dept: PAIN MEDICINE | Facility: CLINIC | Age: 64
End: 2024-08-14
Payer: COMMERCIAL

## 2024-08-14 NOTE — TELEPHONE ENCOUNTER
The pt stated she only had 30% relief and felt that is was not a successful injection. The pt has a follow up appointment scheduled at this time.

## 2024-08-21 ENCOUNTER — OFFICE VISIT (OUTPATIENT)
Dept: PAIN MEDICINE | Facility: CLINIC | Age: 64
End: 2024-08-21
Payer: MEDICARE

## 2024-08-21 VITALS
TEMPERATURE: 97.7 F | HEART RATE: 64 BPM | SYSTOLIC BLOOD PRESSURE: 172 MMHG | RESPIRATION RATE: 18 BRPM | OXYGEN SATURATION: 98 % | DIASTOLIC BLOOD PRESSURE: 71 MMHG

## 2024-08-21 DIAGNOSIS — M43.06 LUMBAR SPONDYLOLYSIS: Primary | ICD-10-CM

## 2024-08-21 PROCEDURE — 99213 OFFICE O/P EST LOW 20 MIN: CPT

## 2024-08-21 ASSESSMENT — PAIN SCALES - GENERAL
PAINLEVEL_OUTOF10: 0 - NO PAIN
PAINLEVEL: 0-NO PAIN

## 2024-08-21 ASSESSMENT — PAIN - FUNCTIONAL ASSESSMENT: PAIN_FUNCTIONAL_ASSESSMENT: 0-10

## 2024-08-21 NOTE — PROGRESS NOTES
Subjective   Patient ID: Darlin Ryan is a 64 y.o. female who presents for Back Pain (Right butt cheek).  HPI    65 yo female presents today for follow up after diagnostic facet on 08/13/24.  She reports that following the injection she experienced improved mobility and ability to sit, stand and walk. She reports 80% pain reduction for 1- 2 hours following the diagnostic. She states that the relief was not immediate and that the injection took awhile to start working. Today her pain is rated 0/10 in the low back with occasional pain in the right buttocks. She is not taking any blood thinners. Denies any bowel or bladder incontinence.        Review of Systems  All 13 systems were reviewed and are within normal levels except as noted below or per HPI. Positive and pertinent negative responses are noted below or in the HPI   Denied any fever or chills. No weight loss and no night sweats. No cough or sputum production. No diarrhea   Denies constipation.   No bladder and bowel incontinence and no other changes in bladder and bowel. No skin changes. Reports tiredness and fatigability only if the pain is not controlled.   Denied opioids diversion and abuse and denies alcoholism. Denies overuse of the pain medications.    Objective   Physical Exam    General   Alert and oriented x4, pleasant and cooperative.      HEENT  Pupils are equal and normal in size. Ears, nose, mouth, and throat appear to be WNL.  Head atraumatic, symmetric.      No signs of sedation or signs of withdrawal apparent.     Psychiatric   No signs of depression apparent. Appropriate mood and affect.     Neuro   No focal neurological deficit apparent. Ambulation at baseline.      Respiratory  No respiratory distress, respirations equal and unlabored.     Abdomen  Soft and nontender, no distention noted.     Skin  Warm, dry and intact. No skin markings supportive of recent IV drug usage .            Assessment/Plan        65 yo female with history and  physical exam supportive of chronic low back pain associated with lumbar spondylosis.     Schedule second diagnostic medial nerve branch block bilateral L4- L5, L5- S1 under fluoroscopic guidance. We will reevaluate the patient following the procedure and if she reports positive response we can proceed with RFA.     Follow up as needed.     Explained plan to this patient, and patient verbalized understanding and agreement with the plan.     Please do not hesitate to contact the pain clinic after your visit with any questions or concerns at  M-F 8-4 pm     Patient was reminded not to share medications, not to take prescription medications that were not prescribed to the patient, and not to increase or change dose without consulting the pain clinic. I advised the patient to always take the least amount of medication needed to keep symptoms under control.       MAYCOL Brambila-CNP 08/21/24 10:39 AM

## 2024-08-21 NOTE — PROGRESS NOTES
Post facet with no relief. Here to talk about other options, but pain has improved in the last 3 days

## 2024-08-26 ENCOUNTER — LAB (OUTPATIENT)
Dept: LAB | Facility: LAB | Age: 64
End: 2024-08-26
Payer: COMMERCIAL

## 2024-08-26 DIAGNOSIS — E11.65 CONTROLLED TYPE 2 DIABETES MELLITUS WITH HYPERGLYCEMIA, WITHOUT LONG-TERM CURRENT USE OF INSULIN (MULTI): ICD-10-CM

## 2024-08-26 DIAGNOSIS — E11.65 CONTROLLED TYPE 2 DIABETES MELLITUS WITH HYPERGLYCEMIA, WITHOUT LONG-TERM CURRENT USE OF INSULIN (MULTI): Primary | ICD-10-CM

## 2024-08-26 LAB
ANION GAP SERPL CALC-SCNC: 14 MMOL/L (ref 10–20)
BUN SERPL-MCNC: 17 MG/DL (ref 6–23)
CALCIUM SERPL-MCNC: 9.5 MG/DL (ref 8.6–10.6)
CHLORIDE SERPL-SCNC: 102 MMOL/L (ref 98–107)
CHOLEST SERPL-MCNC: 225 MG/DL (ref 0–199)
CHOLESTEROL/HDL RATIO: 5.6
CO2 SERPL-SCNC: 29 MMOL/L (ref 21–32)
CREAT SERPL-MCNC: 1.18 MG/DL (ref 0.5–1.05)
EGFRCR SERPLBLD CKD-EPI 2021: 51 ML/MIN/1.73M*2
EST. AVERAGE GLUCOSE BLD GHB EST-MCNC: 97 MG/DL
GLUCOSE SERPL-MCNC: 109 MG/DL (ref 74–99)
HBA1C MFR BLD: 5 %
HDLC SERPL-MCNC: 40.3 MG/DL
LDLC SERPL CALC-MCNC: 154 MG/DL
NON HDL CHOLESTEROL: 185 MG/DL (ref 0–149)
POTASSIUM SERPL-SCNC: 4.5 MMOL/L (ref 3.5–5.3)
SODIUM SERPL-SCNC: 140 MMOL/L (ref 136–145)
TRIGL SERPL-MCNC: 152 MG/DL (ref 0–149)
VLDL: 30 MG/DL (ref 0–40)

## 2024-08-26 PROCEDURE — 83036 HEMOGLOBIN GLYCOSYLATED A1C: CPT

## 2024-08-26 PROCEDURE — 80061 LIPID PANEL: CPT

## 2024-08-26 PROCEDURE — 80048 BASIC METABOLIC PNL TOTAL CA: CPT

## 2024-08-27 ENCOUNTER — APPOINTMENT (OUTPATIENT)
Dept: PRIMARY CARE | Facility: CLINIC | Age: 64
End: 2024-08-27
Payer: MEDICARE

## 2024-08-27 VITALS
RESPIRATION RATE: 16 BRPM | BODY MASS INDEX: 37.22 KG/M2 | SYSTOLIC BLOOD PRESSURE: 117 MMHG | HEIGHT: 66 IN | HEART RATE: 60 BPM | OXYGEN SATURATION: 97 % | DIASTOLIC BLOOD PRESSURE: 76 MMHG | WEIGHT: 231.6 LBS | TEMPERATURE: 96.8 F

## 2024-08-27 DIAGNOSIS — Z78.0 ASYMPTOMATIC MENOPAUSAL STATE: ICD-10-CM

## 2024-08-27 DIAGNOSIS — E11.65 CONTROLLED TYPE 2 DIABETES MELLITUS WITH HYPERGLYCEMIA, WITHOUT LONG-TERM CURRENT USE OF INSULIN (MULTI): ICD-10-CM

## 2024-08-27 DIAGNOSIS — F32.5 MAJOR DEPRESSIVE DISORDER WITH SINGLE EPISODE, IN FULL REMISSION (CMS-HCC): ICD-10-CM

## 2024-08-27 DIAGNOSIS — Z00.00 ROUTINE GENERAL MEDICAL EXAMINATION AT HEALTH CARE FACILITY: Primary | ICD-10-CM

## 2024-08-27 DIAGNOSIS — Z13.6 SCREENING FOR CARDIOVASCULAR CONDITION: ICD-10-CM

## 2024-08-27 DIAGNOSIS — I10 BENIGN ESSENTIAL HYPERTENSION: ICD-10-CM

## 2024-08-27 PROCEDURE — G0403 EKG FOR INITIAL PREVENT EXAM: HCPCS | Performed by: INTERNAL MEDICINE

## 2024-08-27 PROCEDURE — G0402 INITIAL PREVENTIVE EXAM: HCPCS | Performed by: INTERNAL MEDICINE

## 2024-08-27 PROCEDURE — 1160F RVW MEDS BY RX/DR IN RCRD: CPT | Performed by: INTERNAL MEDICINE

## 2024-08-27 PROCEDURE — 90677 PCV20 VACCINE IM: CPT | Performed by: INTERNAL MEDICINE

## 2024-08-27 PROCEDURE — 3008F BODY MASS INDEX DOCD: CPT | Performed by: INTERNAL MEDICINE

## 2024-08-27 PROCEDURE — 3061F NEG MICROALBUMINURIA REV: CPT | Performed by: INTERNAL MEDICINE

## 2024-08-27 PROCEDURE — 99213 OFFICE O/P EST LOW 20 MIN: CPT | Performed by: INTERNAL MEDICINE

## 2024-08-27 PROCEDURE — 3078F DIAST BP <80 MM HG: CPT | Performed by: INTERNAL MEDICINE

## 2024-08-27 PROCEDURE — 1123F ACP DISCUSS/DSCN MKR DOCD: CPT | Performed by: INTERNAL MEDICINE

## 2024-08-27 PROCEDURE — 3044F HG A1C LEVEL LT 7.0%: CPT | Performed by: INTERNAL MEDICINE

## 2024-08-27 PROCEDURE — G0009 ADMIN PNEUMOCOCCAL VACCINE: HCPCS | Performed by: INTERNAL MEDICINE

## 2024-08-27 PROCEDURE — 1159F MED LIST DOCD IN RCRD: CPT | Performed by: INTERNAL MEDICINE

## 2024-08-27 PROCEDURE — 1170F FXNL STATUS ASSESSED: CPT | Performed by: INTERNAL MEDICINE

## 2024-08-27 PROCEDURE — 3074F SYST BP LT 130 MM HG: CPT | Performed by: INTERNAL MEDICINE

## 2024-08-27 PROCEDURE — 3050F LDL-C >= 130 MG/DL: CPT | Performed by: INTERNAL MEDICINE

## 2024-08-27 PROCEDURE — 1036F TOBACCO NON-USER: CPT | Performed by: INTERNAL MEDICINE

## 2024-08-27 RX ORDER — CITALOPRAM 40 MG/1
40 TABLET, FILM COATED ORAL DAILY
Qty: 90 TABLET | Refills: 1 | Status: SHIPPED | OUTPATIENT
Start: 2024-08-27

## 2024-08-27 RX ORDER — TIRZEPATIDE 12.5 MG/.5ML
12.5 INJECTION, SOLUTION SUBCUTANEOUS
Qty: 6 ML | Refills: 1 | Status: SHIPPED | OUTPATIENT
Start: 2024-09-01 | End: 2024-08-30 | Stop reason: ALTCHOICE

## 2024-08-27 ASSESSMENT — ENCOUNTER SYMPTOMS
DYSPHORIC MOOD: 0
SHORTNESS OF BREATH: 0
HEADACHES: 0
WHEEZING: 0
FREQUENCY: 0
ABDOMINAL PAIN: 0
VOMITING: 0
DIZZINESS: 0
PALPITATIONS: 0
DYSURIA: 0
HEMATURIA: 0
RHINORRHEA: 0
DIARRHEA: 0
WOUND: 0
CHILLS: 0
POLYPHAGIA: 0
NAUSEA: 0
EYE PAIN: 0
COUGH: 0
NERVOUS/ANXIOUS: 0
CONSTIPATION: 0
UNEXPECTED WEIGHT CHANGE: 0
MYALGIAS: 1
POLYDIPSIA: 0
BLOOD IN STOOL: 0
CHEST TIGHTNESS: 0
ARTHRALGIAS: 1
FEVER: 0
SORE THROAT: 0

## 2024-08-27 ASSESSMENT — ACTIVITIES OF DAILY LIVING (ADL)
DOING_HOUSEWORK: INDEPENDENT
TAKING_MEDICATION: INDEPENDENT
GROCERY_SHOPPING: INDEPENDENT
MANAGING_FINANCES: INDEPENDENT
BATHING: INDEPENDENT
DRESSING: INDEPENDENT

## 2024-08-27 NOTE — PROGRESS NOTES
Subjective   Reason for Visit: Darlin Ryan is an 64 y.o. female here for a Medicare Wellness visit.     Past Medical, Surgical, and Family History reviewed and updated in chart.    Reviewed all medications by prescribing practitioner or clinical pharmacist (such as prescriptions, OTCs, herbal therapies and supplements) and documented in the medical record.    HPI    Here for welcome to medicare  Still has SI pain and back pain  To have injection soon next month  She is feeling somewhat better  Has lost weight    10 mg on Mounjaro 7/31 but was on 12.5 mg before this. This was what they had    Sugars low 100s    Had one high BP at pain mgmt but otherwise has been goof    Thinks she has HPOA paperwork but will check    Wants to start exercising when pain better  Has been watching diet  Has had more red meat than usual    Patient Care Team:  Teri Odonnell MD as PCP - General  Teri Odonnell MD as PCP - MMO [facility] PCP     Review of Systems   Constitutional:  Negative for chills, fever and unexpected weight change.   HENT:  Negative for congestion, hearing loss, rhinorrhea and sore throat.    Eyes:  Negative for pain and visual disturbance.   Respiratory:  Negative for cough, chest tightness, shortness of breath and wheezing.    Cardiovascular:  Negative for chest pain and palpitations.   Gastrointestinal:  Negative for abdominal pain, blood in stool, constipation, diarrhea, nausea and vomiting.   Endocrine: Negative for cold intolerance, heat intolerance, polydipsia and polyphagia.   Genitourinary:  Negative for dysuria, frequency and hematuria.   Musculoskeletal:  Positive for arthralgias and myalgias.   Skin:  Negative for rash and wound.   Neurological:  Negative for dizziness, syncope and headaches.   Psychiatric/Behavioral:  Negative for dysphoric mood. The patient is not nervous/anxious.        Objective   Vitals:  /76 (BP Location: Left arm, Patient Position: Sitting, BP Cuff Size: Large adult)   Pulse 60    "Temp 36 °C (96.8 °F)   Resp 16   Ht 1.676 m (5' 6\")   Wt 105 kg (231 lb 9.6 oz)   SpO2 97%   BMI 37.38 kg/m²       Physical Exam  Vitals reviewed.   Constitutional:       Appearance: Normal appearance. She is not ill-appearing.   HENT:      Head: Normocephalic and atraumatic.      Right Ear: Tympanic membrane normal.      Left Ear: Tympanic membrane normal.      Nose: Nose normal.      Mouth/Throat:      Mouth: Mucous membranes are moist.      Pharynx: Oropharynx is clear.   Eyes:      Extraocular Movements: Extraocular movements intact.      Conjunctiva/sclera: Conjunctivae normal.      Pupils: Pupils are equal, round, and reactive to light.   Cardiovascular:      Rate and Rhythm: Normal rate and regular rhythm.   Pulmonary:      Effort: Pulmonary effort is normal.      Breath sounds: Normal breath sounds. No wheezing.   Abdominal:      General: There is no distension.      Palpations: Abdomen is soft. There is no mass.      Tenderness: There is no abdominal tenderness.   Musculoskeletal:      Cervical back: Neck supple.      Right lower leg: No edema.      Left lower leg: No edema.   Lymphadenopathy:      Cervical: No cervical adenopathy.   Neurological:      General: No focal deficit present.      Mental Status: She is alert and oriented to person, place, and time.      Gait: Gait normal.   Psychiatric:         Mood and Affect: Mood normal.         Behavior: Behavior normal.         Thought Content: Thought content normal.         Assessment/Plan   Problem List Items Addressed This Visit             ICD-10-CM    Benign essential hypertension I10    Depression F32.A    Relevant Medications    citalopram (CeleXA) 40 mg tablet    Diabetes type 2, controlled (Multi) E11.9    Relevant Medications    tirzepatide (Mounjaro) 12.5 mg/0.5 mL pen injector (Start on 9/1/2024)    Other Relevant Orders    Comprehensive Metabolic Panel    Hemoglobin A1c    Lipid Panel     Other Visit Diagnoses         Codes    Routine " general medical examination at health care facility    -  Primary Z00.00    Screening for cardiovascular condition     Z13.6    Relevant Orders    ECG 12 lead (Completed)    Asymptomatic menopausal state     Z78.0    Relevant Orders    XR DEXA bone density        CPE completed.  Advised to keep a heart healthy, low fat  diet with fruits and veggies like Mediterranean diet.  Advised on the importance of exercise and maintaining 150 minutes of exercise per week (30 minutes per day 5 days a week).  Advised on regular eye and dental visits.  Discussed age appropriate cancer screening, immunizations and recommendations given.  Discussed avoiding illicit drugs and tobacco. Advised on appropriate use of alcohol.  Advised to wear seat belt.    Back/SI pain  -seeing pain mgmt      Obesity:  -topamax did not help  -Phentermine helped. Down 20 pounds.   -mounjaro is on backorder  -did not have an affect with ozempic     Muscle aches: sounds like from statin  -discussed zetia but wants to try diet changes     TMJ issues:      New onset type 2 diabetes: a1c: 11.3--> 6.3--> 6.4--> 5.6-->6.1--> 5.3--> 5.4--> 5.8-->6.1--> 6.6--> 5.6--> 5.0  -off metformin  -on Mounjaro  -+ urine microalbumin (1/20)--> now normal 10/22     Migraines from metformin: off topamax     Hypertension: resolved off of meds  -Lisinopril caused migraines     Proteinuria: resolved     Leukopenia: resolved     Hyperlipidemia: uncontrolled  -crestor and pravastatin caused muscle aches  -consider zetia next visit     Shoulder pain and neck pain: seeing ortho  -has a tear     Known Fatty liver:   -weight loss-congratulated  -liver enzymes normalized     Depression  -follows with Dr. Hilda Lockwood ([place])--has not seen  -controlled on celexa and desyrel and prn vistaril  -if stable, I can prescribe. If worsens back to psychiatry     Chronic back pain: herniated disc, DDD, and sciatica  -she has had injections  -can use salon past patches, voltaren gel  -seeing  pain mgmt  -did injection  -on lyrica  -to have SI joint injection     Hx of lichen planus: follows with dermatology--Dr. Drake     Venous insufficiency: compression stockings, elevate legs     B12 deficiency: cont b12 pills      4 months f/u with labs before    Health Maintenance:   -pap smear: 10/20- neg, repeat 5 years  -Mammogram: refused mammo. Interested in MRI maybe in future--declines today  -Colonoscopy: 10/26/22- cologuard neg  -Lung Cancer Screening: quit 2004  -Vaccinations: Pneumovax UTD. advised shingrix and tdap. Flu UTD. Prevnar-20  -DEXA: at 64

## 2024-08-30 ENCOUNTER — TELEPHONE (OUTPATIENT)
Dept: PRIMARY CARE | Facility: CLINIC | Age: 64
End: 2024-08-30
Payer: COMMERCIAL

## 2024-08-30 DIAGNOSIS — E11.65 CONTROLLED TYPE 2 DIABETES MELLITUS WITH HYPERGLYCEMIA, WITHOUT LONG-TERM CURRENT USE OF INSULIN (MULTI): Primary | ICD-10-CM

## 2024-08-30 RX ORDER — TIRZEPATIDE 10 MG/.5ML
10 INJECTION, SOLUTION SUBCUTANEOUS WEEKLY
Qty: 2 ML | Refills: 3 | Status: SHIPPED | OUTPATIENT
Start: 2024-08-30

## 2024-08-30 NOTE — TELEPHONE ENCOUNTER
Called in prescription for mounjaro at 12.5. Pharmacy does not have 12.5 but they do have the 10mg.     Pt wondered if the alternative dosage would be okay, if so to please call it in

## 2024-09-05 ENCOUNTER — HOSPITAL ENCOUNTER (OUTPATIENT)
Dept: PAIN MEDICINE | Facility: CLINIC | Age: 64
Discharge: HOME | End: 2024-09-05
Payer: COMMERCIAL

## 2024-09-05 VITALS
TEMPERATURE: 98 F | RESPIRATION RATE: 18 BRPM | SYSTOLIC BLOOD PRESSURE: 168 MMHG | OXYGEN SATURATION: 98 % | HEART RATE: 60 BPM | DIASTOLIC BLOOD PRESSURE: 73 MMHG

## 2024-09-05 DIAGNOSIS — M43.06 LUMBAR SPONDYLOLYSIS: ICD-10-CM

## 2024-09-05 PROCEDURE — 64493 INJ PARAVERT F JNT L/S 1 LEV: CPT | Performed by: PAIN MEDICINE

## 2024-09-05 PROCEDURE — 2550000001 HC RX 255 CONTRASTS: Performed by: PAIN MEDICINE

## 2024-09-05 PROCEDURE — 2500000004 HC RX 250 GENERAL PHARMACY W/ HCPCS (ALT 636 FOR OP/ED): Mod: JZ | Performed by: PAIN MEDICINE

## 2024-09-05 PROCEDURE — 64493 INJ PARAVERT F JNT L/S 1 LEV: CPT | Mod: 50 | Performed by: PAIN MEDICINE

## 2024-09-05 PROCEDURE — 64494 INJ PARAVERT F JNT L/S 2 LEV: CPT | Mod: 50 | Performed by: PAIN MEDICINE

## 2024-09-05 PROCEDURE — 2500000005 HC RX 250 GENERAL PHARMACY W/O HCPCS: Performed by: PAIN MEDICINE

## 2024-09-05 PROCEDURE — 64494 INJ PARAVERT F JNT L/S 2 LEV: CPT | Performed by: PAIN MEDICINE

## 2024-09-05 RX ORDER — METHYLPREDNISOLONE ACETATE 80 MG/ML
INJECTION, SUSPENSION INTRA-ARTICULAR; INTRALESIONAL; INTRAMUSCULAR; SOFT TISSUE AS NEEDED
Status: COMPLETED | OUTPATIENT
Start: 2024-09-05 | End: 2024-09-05

## 2024-09-05 RX ORDER — LIDOCAINE HYDROCHLORIDE 10 MG/ML
INJECTION, SOLUTION EPIDURAL; INFILTRATION; INTRACAUDAL; PERINEURAL AS NEEDED
Status: COMPLETED | OUTPATIENT
Start: 2024-09-05 | End: 2024-09-05

## 2024-09-05 RX ORDER — BUPIVACAINE HYDROCHLORIDE 2.5 MG/ML
INJECTION, SOLUTION EPIDURAL; INFILTRATION; INTRACAUDAL AS NEEDED
Status: COMPLETED | OUTPATIENT
Start: 2024-09-05 | End: 2024-09-05

## 2024-09-05 ASSESSMENT — ENCOUNTER SYMPTOMS
DEPRESSION: 0
OCCASIONAL FEELINGS OF UNSTEADINESS: 0
LOSS OF SENSATION IN FEET: 0

## 2024-09-05 ASSESSMENT — PAIN SCALES - GENERAL: PAINLEVEL_OUTOF10: 2

## 2024-09-05 ASSESSMENT — PAIN DESCRIPTION - DESCRIPTORS: DESCRIPTORS: ACHING;STABBING

## 2024-09-05 ASSESSMENT — PAIN - FUNCTIONAL ASSESSMENT: PAIN_FUNCTIONAL_ASSESSMENT: 0-10

## 2024-09-05 NOTE — DISCHARGE INSTRUCTIONS
Post-injection instructions FOR FACET BLOCK      Pay attention to how much pain relief (what percentage compared to before the procedure) you get and for how long it lasts.     THIS IS A TEMPORARY NUMBING OF PAIN THIS IS BEING USED AS A DIAGNOSTIC INDICATOR IF THIS IS THE SOURCE OF YOUR PAIN    Activity:  RETURN TO NORMAL ACTIVITY  SEE IF YOU CAN APPRECIATE AN IMPROVEMENT IN THE PAIN    Bandages: Remove after 24 hours     Showering/Bathing: You may shower after bandage is removed     Follow up: CALL OFFICE NEXT BUSINESS [phone] LEAVE MESSAGE ABOUT THE % OF RELIEF THAT WAS OBTAINED AND FOR HOW MANY HOURS      Call the OFFICE immediately: if you notice:     Excessive bleeding from procedure site (brisk bright red bleeding from the site or bleeding that soaks the bandages or does not stop)   Severe headache  Inability to walk, leg or arm weakness or numbness that is worse after the procedure   Uncontrolled pain   New urinary or fecal incontinence   Signs of infection: Fever above 101.5F, redness, swelling, pus or drainage from the site  \

## 2024-09-06 ENCOUNTER — TELEPHONE (OUTPATIENT)
Dept: PAIN MEDICINE | Facility: CLINIC | Age: 64
End: 2024-09-06
Payer: COMMERCIAL

## 2024-09-06 DIAGNOSIS — M47.816 SPONDYLOSIS WITHOUT MYELOPATHY OR RADICULOPATHY, LUMBAR REGION: Primary | ICD-10-CM

## 2024-09-06 RX ORDER — SODIUM CHLORIDE, SODIUM LACTATE, POTASSIUM CHLORIDE, CALCIUM CHLORIDE 600; 310; 30; 20 MG/100ML; MG/100ML; MG/100ML; MG/100ML
20 INJECTION, SOLUTION INTRAVENOUS CONTINUOUS
OUTPATIENT
Start: 2024-09-06

## 2024-09-10 ENCOUNTER — TELEPHONE (OUTPATIENT)
Dept: PAIN MEDICINE | Facility: CLINIC | Age: 64
End: 2024-09-10
Payer: COMMERCIAL

## 2024-09-10 ENCOUNTER — HOSPITAL ENCOUNTER (OUTPATIENT)
Dept: RADIOLOGY | Facility: CLINIC | Age: 64
Discharge: HOME | End: 2024-09-10
Payer: MEDICARE

## 2024-09-10 DIAGNOSIS — Z78.0 ASYMPTOMATIC MENOPAUSAL STATE: ICD-10-CM

## 2024-09-10 PROCEDURE — 77080 DXA BONE DENSITY AXIAL: CPT

## 2024-09-10 PROCEDURE — 77080 DXA BONE DENSITY AXIAL: CPT | Performed by: RADIOLOGY

## 2024-10-15 ENCOUNTER — HOSPITAL ENCOUNTER (OUTPATIENT)
Dept: PAIN MEDICINE | Facility: CLINIC | Age: 64
Discharge: HOME | End: 2024-10-15
Payer: MEDICARE

## 2024-10-15 VITALS
HEART RATE: 64 BPM | RESPIRATION RATE: 20 BRPM | TEMPERATURE: 96.8 F | DIASTOLIC BLOOD PRESSURE: 68 MMHG | SYSTOLIC BLOOD PRESSURE: 141 MMHG | OXYGEN SATURATION: 99 %

## 2024-10-15 DIAGNOSIS — M47.816 SPONDYLOSIS WITHOUT MYELOPATHY OR RADICULOPATHY, LUMBAR REGION: ICD-10-CM

## 2024-10-15 PROCEDURE — 64636 DESTROY L/S FACET JNT ADDL: CPT | Performed by: PAIN MEDICINE

## 2024-10-15 PROCEDURE — 64635 DESTROY LUMB/SAC FACET JNT: CPT | Mod: 50 | Performed by: PAIN MEDICINE

## 2024-10-15 PROCEDURE — 64636 DESTROY L/S FACET JNT ADDL: CPT | Mod: 50 | Performed by: PAIN MEDICINE

## 2024-10-15 PROCEDURE — 64635 DESTROY LUMB/SAC FACET JNT: CPT | Performed by: PAIN MEDICINE

## 2024-10-15 PROCEDURE — 2500000004 HC RX 250 GENERAL PHARMACY W/ HCPCS (ALT 636 FOR OP/ED): Performed by: PAIN MEDICINE

## 2024-10-15 PROCEDURE — 2720000007 HC OR 272 NO HCPCS

## 2024-10-15 RX ORDER — BUPIVACAINE HYDROCHLORIDE 5 MG/ML
INJECTION, SOLUTION EPIDURAL; INTRACAUDAL AS NEEDED
Status: COMPLETED | OUTPATIENT
Start: 2024-10-15 | End: 2024-10-15

## 2024-10-15 RX ORDER — LIDOCAINE HYDROCHLORIDE 10 MG/ML
INJECTION, SOLUTION EPIDURAL; INFILTRATION; INTRACAUDAL; PERINEURAL AS NEEDED
Status: COMPLETED | OUTPATIENT
Start: 2024-10-15 | End: 2024-10-15

## 2024-10-15 ASSESSMENT — PAIN SCALES - GENERAL
PAINLEVEL_OUTOF10: 3
PAINLEVEL_OUTOF10: 3

## 2024-10-15 ASSESSMENT — PAIN - FUNCTIONAL ASSESSMENT
PAIN_FUNCTIONAL_ASSESSMENT: 0-10
PAIN_FUNCTIONAL_ASSESSMENT: 0-10

## 2024-10-15 NOTE — DISCHARGE INSTRUCTIONS
Post-injection instructions FOR Radiofrequency Ablation    Your radiofrequency ablation was completed today is not unusual to have some exacerbation of the pain before you get better.  Radiofrequency ablation takes an average of 2 to 3 weeks before it completely starts showing full results      Activity:  RETURN TO NORMAL ACTIVITY once the sedation is completely worn off do not sign any legal document for the first 24 hours do not drive or operate machinery for the first 24 hours until the sedation effect is completely worn off    Bandages: Remove after 24 hours     Showering/Bathing: You may shower after bandage is removed     May use ice at the site of the injection for the first 24 hours      Call the OFFICE immediately: if you notice:     Excessive bleeding from procedure site (brisk bright red bleeding from the site or bleeding that soaks the bandages or does not stop)   Severe headache  Inability to walk, leg or arm weakness or numbness that is worse after the procedure   Uncontrolled pain   New urinary or fecal incontinence   Signs of infection: Fever above 101.5F, redness, swelling, pus or drainage from the site    Please contact the pain clinic at [phone]  in 3 weeks to report results or with any further questions or concerns

## 2024-11-26 DIAGNOSIS — E11.65 CONTROLLED TYPE 2 DIABETES MELLITUS WITH HYPERGLYCEMIA, WITHOUT LONG-TERM CURRENT USE OF INSULIN: ICD-10-CM

## 2024-11-26 RX ORDER — TIRZEPATIDE 10 MG/.5ML
10 INJECTION, SOLUTION SUBCUTANEOUS WEEKLY
Qty: 2 ML | Refills: 3 | Status: SHIPPED | OUTPATIENT
Start: 2024-11-26

## 2024-11-26 NOTE — TELEPHONE ENCOUNTER
Pt returned my call to move her appt from 12/31 to 2/6/25. Pt mentioned that she will be out of [facility] before that appointment.  Can you send to get to that appointment?    Pharmacy=  [facility] [place]    Please call pt at [phone]

## 2024-12-04 DIAGNOSIS — F32.5 MAJOR DEPRESSIVE DISORDER WITH SINGLE EPISODE, IN FULL REMISSION (CMS-HCC): ICD-10-CM

## 2024-12-04 RX ORDER — CITALOPRAM 40 MG/1
40 TABLET, FILM COATED ORAL DAILY
Qty: 90 TABLET | Refills: 1 | Status: SHIPPED | OUTPATIENT
Start: 2024-12-04

## 2024-12-04 NOTE — TELEPHONE ENCOUNTER
Pt called- Pt does not have a refill on the generic medication for celexa and does need a refill. Pt is completely out of the mediatation

## 2024-12-31 ENCOUNTER — APPOINTMENT (OUTPATIENT)
Dept: PRIMARY CARE | Facility: CLINIC | Age: 64
End: 2024-12-31
Payer: COMMERCIAL

## 2025-02-06 ENCOUNTER — APPOINTMENT (OUTPATIENT)
Dept: PRIMARY CARE | Facility: CLINIC | Age: 65
End: 2025-02-06
Payer: COMMERCIAL

## 2025-02-06 VITALS
WEIGHT: 232.6 LBS | SYSTOLIC BLOOD PRESSURE: 130 MMHG | OXYGEN SATURATION: 97 % | HEART RATE: 77 BPM | BODY MASS INDEX: 37.38 KG/M2 | HEIGHT: 66 IN | DIASTOLIC BLOOD PRESSURE: 78 MMHG

## 2025-02-06 DIAGNOSIS — Z78.9 STATIN INTOLERANCE: ICD-10-CM

## 2025-02-06 DIAGNOSIS — E11.65 CONTROLLED TYPE 2 DIABETES MELLITUS WITH HYPERGLYCEMIA, WITHOUT LONG-TERM CURRENT USE OF INSULIN: ICD-10-CM

## 2025-02-06 DIAGNOSIS — E53.8 B12 DEFICIENCY: ICD-10-CM

## 2025-02-06 DIAGNOSIS — Z13.6 SCREENING FOR CARDIOVASCULAR CONDITION: ICD-10-CM

## 2025-02-06 DIAGNOSIS — E55.9 VITAMIN D DEFICIENCY: ICD-10-CM

## 2025-02-06 DIAGNOSIS — E66.01 SEVERE OBESITY (BMI 35.0-39.9) WITH COMORBIDITY (MULTI): ICD-10-CM

## 2025-02-06 DIAGNOSIS — N18.31 STAGE 3A CHRONIC KIDNEY DISEASE (MULTI): Primary | ICD-10-CM

## 2025-02-06 LAB
ALBUMIN SERPL-MCNC: 4 G/DL (ref 3.6–5.1)
ALP SERPL-CCNC: 67 U/L (ref 37–153)
ALT SERPL-CCNC: 12 U/L (ref 6–29)
ANION GAP SERPL CALCULATED.4IONS-SCNC: 9 MMOL/L (CALC) (ref 7–17)
AST SERPL-CCNC: 19 U/L (ref 10–35)
BILIRUB SERPL-MCNC: 0.7 MG/DL (ref 0.2–1.2)
BUN SERPL-MCNC: 13 MG/DL (ref 7–25)
CALCIUM SERPL-MCNC: 8.9 MG/DL (ref 8.6–10.4)
CHLORIDE SERPL-SCNC: 104 MMOL/L (ref 98–110)
CHOLEST SERPL-MCNC: 210 MG/DL
CHOLEST/HDLC SERPL: 4.5 (CALC)
CO2 SERPL-SCNC: 27 MMOL/L (ref 20–32)
CREAT SERPL-MCNC: 1.06 MG/DL (ref 0.5–1.05)
EGFRCR SERPLBLD CKD-EPI 2021: 58 ML/MIN/1.73M2
EST. AVERAGE GLUCOSE BLD GHB EST-MCNC: 108 MG/DL
EST. AVERAGE GLUCOSE BLD GHB EST-SCNC: 6 MMOL/L
GLUCOSE SERPL-MCNC: 111 MG/DL (ref 65–99)
HBA1C MFR BLD: 5.4 % OF TOTAL HGB
HDLC SERPL-MCNC: 47 MG/DL
LDLC SERPL CALC-MCNC: 141 MG/DL (CALC)
NONHDLC SERPL-MCNC: 163 MG/DL (CALC)
POTASSIUM SERPL-SCNC: 5 MMOL/L (ref 3.5–5.3)
PROT SERPL-MCNC: 6.6 G/DL (ref 6.1–8.1)
SODIUM SERPL-SCNC: 140 MMOL/L (ref 135–146)
TRIGL SERPL-MCNC: 105 MG/DL

## 2025-02-06 PROCEDURE — 1036F TOBACCO NON-USER: CPT | Performed by: INTERNAL MEDICINE

## 2025-02-06 PROCEDURE — 3008F BODY MASS INDEX DOCD: CPT | Performed by: INTERNAL MEDICINE

## 2025-02-06 PROCEDURE — 99214 OFFICE O/P EST MOD 30 MIN: CPT | Performed by: INTERNAL MEDICINE

## 2025-02-06 PROCEDURE — 1160F RVW MEDS BY RX/DR IN RCRD: CPT | Performed by: INTERNAL MEDICINE

## 2025-02-06 PROCEDURE — 1123F ACP DISCUSS/DSCN MKR DOCD: CPT | Performed by: INTERNAL MEDICINE

## 2025-02-06 PROCEDURE — 3075F SYST BP GE 130 - 139MM HG: CPT | Performed by: INTERNAL MEDICINE

## 2025-02-06 PROCEDURE — 3078F DIAST BP <80 MM HG: CPT | Performed by: INTERNAL MEDICINE

## 2025-02-06 PROCEDURE — 1159F MED LIST DOCD IN RCRD: CPT | Performed by: INTERNAL MEDICINE

## 2025-02-06 PROCEDURE — G2211 COMPLEX E/M VISIT ADD ON: HCPCS | Performed by: INTERNAL MEDICINE

## 2025-02-06 RX ORDER — ACETAMINOPHEN, DIPHENHYDRAMINE HCL, PHENYLEPHRINE HCL 325; 25; 5 MG/1; MG/1; MG/1
TABLET ORAL DAILY
COMMUNITY

## 2025-02-06 ASSESSMENT — ENCOUNTER SYMPTOMS
WHEEZING: 0
CONSTIPATION: 0
BLOOD IN STOOL: 0
HEMATURIA: 0
RHINORRHEA: 0
NAUSEA: 0
UNEXPECTED WEIGHT CHANGE: 0
ARTHRALGIAS: 0
WOUND: 0
DYSPHORIC MOOD: 0
DIARRHEA: 0
SHORTNESS OF BREATH: 0
COUGH: 0
POLYDIPSIA: 0
PALPITATIONS: 0
CHILLS: 0
CHEST TIGHTNESS: 0
SLEEP DISTURBANCE: 1
VOMITING: 0
EYE PAIN: 0
FEVER: 0
POLYPHAGIA: 0
HEADACHES: 0
DYSURIA: 0
MYALGIAS: 0
DIZZINESS: 0
SORE THROAT: 0
ABDOMINAL PAIN: 0
NERVOUS/ANXIOUS: 0
FREQUENCY: 0

## 2025-02-10 ENCOUNTER — APPOINTMENT (OUTPATIENT)
Dept: PHARMACY | Facility: HOSPITAL | Age: 65
End: 2025-02-10
Payer: COMMERCIAL

## 2025-02-10 DIAGNOSIS — E11.65 CONTROLLED TYPE 2 DIABETES MELLITUS WITH HYPERGLYCEMIA, WITHOUT LONG-TERM CURRENT USE OF INSULIN: ICD-10-CM

## 2025-02-10 RX ORDER — IBUPROFEN 200 MG
200 TABLET ORAL EVERY 6 HOURS PRN
COMMUNITY

## 2025-02-10 RX ORDER — TIRZEPATIDE 10 MG/.5ML
10 INJECTION, SOLUTION SUBCUTANEOUS WEEKLY
Qty: 2 ML | Refills: 3 | Status: SHIPPED | OUTPATIENT
Start: 2025-02-10

## 2025-02-10 NOTE — PROGRESS NOTES
Clinical Pharmacy Appointment    Patient ID: Darlin Ryan is a 64 y.o. female who presents for Diabetes.    Pt is here for First appointment.     Referring Provider: Teri Odonnell MD  PCP: Teri Odonnell MD   Last visit with PCP: 02/06/2025   Next visit with PCP: 06/10/2025      Subjective     At last encounter with Dr. Odonnell patient noted that the cost of her Mounjaro increased up to $100 per month. She was referred to the clinical pharmacy team for us to see if there are any options to assist with cost of the medication.     HPI  DIABETES MELLITUS TYPE 2:    Diagnosed with diabetes: ~5 years ago per patient. Known diabetic complications: obesity, CKD.  Does patient follow with Endocrinology: No  Last optometry exam: Fall 2024  Most recent visit in Podiatry: does not follow with podiatry -- patient denies sores or cuts on feet today      Current diabetic medications include:  Mounjaro 10 mg once weekly (Sundays)   Has 2 pens left at home     Past diabetic medications include:  Metformin (does not remember any side effects, switched to Ozempic)  Ozempic (no side effects, switched to Mounjaro for more effectiveness)     Adverse Effects: none reported today, denies GI related side effects     Glucose Readings:  Glucometer/CGM Type: Glucometer, not sure of brand  Patient tests BG twice per week fasting in the morning     Current home BG readings (mg/dL): 94 mg/dL yesterday, states 90s are typical, if increases may be into the 100s     Any episodes of hypoglycemia? No, denies signs/symptoms of hypoglycemia    Lifestyle:  Diet: 2-3 meals/day.   Breakfast: donut, Bulgarian with green tea   Lunch: typically skips, if she does eat will have a snack (plain frosted mini wheats, Kayla bar   Dinner: does not usually eat breads, will have vegetables and a protein typically   Eats out twice monthly   Snacks: also snacks at night, will have popcorn or sweets if they are at her house   Drinks: cup of green tea in the morning, water  the rest of the day; may occasionally have a sugar free beverage   Physical Activity:   Has had trouble with her back which makes activity difficult  No intentional exercise or activity   Moves around more now throughout the day, likes to clean around the house   Tobacco history: quit in 2004    Secondary Prevention:  Statin? No, unable to tolerate statins due to myalgias   ACE-I/ARB? No  Aspirin? No    Pertinent PMH Review:  PMH of Pancreatitis: No  PMH of Retinopathy: No  PMH of MTC: No  UACR/EGFR in last year?: Yes  Albumin/Creatinine Ratio   Date Value Ref Range Status   04/18/2024 11.1 <30.0 ug/mg Creat Final     Albumin/Creatine Ratio   Date Value Ref Range Status   08/15/2023 20.6 0.0 - 30.0 ug/mg crt Final   10/11/2022 12.9 0.0 - 30.0 ug/mg crt Final     Immunizations:  Influenza? 10/13/22  COVID? 06/29/21  Pneumonia? PCV20 08/27/24  Shingles? None    Drug Interactions  No relevant drug interactions were noted.    Medication System Management  Adherence/Organization: in a typical week does not forget to take her medications, rarely forgets   Affordability/Accessibility: Mounjaro increased up to ~$151 per month     Patient's Preferred Pharmacy    [id number] [place] - [address]   [address] [place] [address]  Phone: [phone] Fax: [phone]    [facility]  [address], [address]  [facility] [address]  Phone: [phone] Fax: [phone]     Objective   Allergies   Allergen Reactions    Crestor [Rosuvastatin] Other     Muscle cramps    Pravastatin Other     Sever muscles cramps.     Social History     Social History Narrative    Not on file      Medication Reconciliation:  No changes made to medication list today, patient confirmed accurate     Medication Review  Current Outpatient Medications   Medication Instructions    aspirin-acetaminophen-caffeine (Excedrin Migraine) 250-250-65 mg tablet 1 tablet, oral, Every 6 hours PRN    betamethasone  "dipropionate 0.05 % cream g: Apply once a day x2 weeks, stop for one week then repeat as needed    blood sugar diagnostic (Blood Glucose Test) strip TEST GLUCOSE ONCE DAILY AS DIRECTED FOR DIABETES    citalopram (CELEXA) 40 mg, oral, Daily    ibuprofen 200 mg, oral, Every 6 hours PRN    melatonin 10 mg, Nightly PRN    Mounjaro 10 mg, subcutaneous, Weekly      Vitals  BP Readings from Last 2 Encounters:   02/06/25 130/78   10/15/24 141/68     BMI Readings from Last 1 Encounters:   02/06/25 37.54 kg/m²      Labs  A1C  Lab Results   Component Value Date    HGBA1C 5.4 02/05/2025    HGBA1C 5.0 08/26/2024    HGBA1C 5.6 04/18/2024     BMP  Lab Results   Component Value Date    CALCIUM 8.9 02/05/2025     02/05/2025    K 5.0 02/05/2025    CO2 27 02/05/2025     02/05/2025    BUN 13 02/05/2025    CREATININE 1.06 (H) 02/05/2025    EGFR 58 (L) 02/05/2025     LFTs  Lab Results   Component Value Date    ALT 12 02/05/2025    AST 19 02/05/2025    ALKPHOS 67 02/05/2025    BILITOT 0.7 02/05/2025     FLP  Lab Results   Component Value Date    TRIG 105 02/05/2025    CHOL 210 (H) 02/05/2025    LDLF 140 (H) 08/15/2023    LDLCALC 141 (H) 02/05/2025    HDL 47 (L) 02/05/2025     Urine Microalbumin  Lab Results   Component Value Date    MICROALBCREA 11.1 04/18/2024     Weight Management  Wt Readings from Last 3 Encounters:   02/06/25 106 kg (232 lb 9.6 oz)   08/27/24 105 kg (231 lb 9.6 oz)   05/03/24 113 kg (250 lb 3.6 oz)      Estimated body mass index is 37.54 kg/m² as calculated from the following:    Height as of 2/6/25: 1.676 m (5' 6\").    Weight as of 2/6/25: 106 kg (232 lb 9.6 oz).      Patient Assistance Screening (VAF)  Patient verbally reports monthly or yearly income which is less than 400% federal poverty level  Application for program has been submitted for the following medications:   Mounjaro 10 mg   Patient aware this process may take up to 2 weeks once income documents have been sent to the team.  If approved, " medication must be filled through [facility] pharmacy and may be picked up or mailed to patient.   If approved, medication will be billed through insurance, and patient assistance team will pay the copay. This will result in a $0 copay for the patient.      Assessment/Plan   Problem List Items Addressed This Visit       Controlled type 2 diabetes mellitus with hyperglycemia, without long-term current use of insulin     Patient's goal A1c is < 7%.  Is pt at goal? Yes, most recent A1c was 5.4% on 02/05/25  Patient's SMBGs are typically in the 90s-100s fasting    Rationale for plan:   Patient's blood sugars and A1c are well controlled with Mounjaro therapy  Continues to tolerate Mounjaro well with no reported side effects  Will continue on 10 mg once weekly for now     Medication Changes:  CONTINUE  Mounjaro 10 mg under the skin once weekly     Future Considerations:  If patient would be interested, could titrate up further for increased weight loss benefits    Monitoring and Education:  Counseled patient on mechanism of action, side effects, contraindications, and what to do if the patient misses a dose. All patients questions were answered.   Initial application submitted for  PAP program, patient will come in to complete income documentation on Wednesday 02/12    I will reach out to patient once a determination on her PAP benefits has been made. At that time we will determine the need for scheduled follow-up. She was provided with my contact information and encouraged to reach out with any questions and/or concerns.          Relevant Medications    tirzepatide (Mounjaro) 10 mg/0.5 mL pen injector     Pharmacy Follow-Up: To be determined pending PAP determination   PCP Follow-Up: 06/10/2025    Continue all meds under the continuation of care with the referring provider and clinical pharmacy team.    Thank you,    Navarro Allen, PharmD   Clinical Pharmacist    Verbal consent to manage patient's drug therapy was  obtained from the patient. They were informed they may decline to participate or withdraw from participation in pharmacy services at any time.

## 2025-02-10 NOTE — ASSESSMENT & PLAN NOTE
Patient's goal A1c is < 7%.  Is pt at goal? Yes, most recent A1c was 5.4% on 02/05/25  Patient's SMBGs are typically in the 90s-100s fasting    Rationale for plan:   Patient's blood sugars and A1c are well controlled with Mounjaro therapy  Continues to tolerate Mounjaro well with no reported side effects  Will continue on 10 mg once weekly for now     Medication Changes:  CONTINUE  Mounjaro 10 mg under the skin once weekly     Future Considerations:  If patient would be interested, could titrate up further for increased weight loss benefits    Monitoring and Education:  Counseled patient on mechanism of action, side effects, contraindications, and what to do if the patient misses a dose. All patients questions were answered.   Initial application submitted for  PAP program, patient will come in to complete income documentation on Wednesday 02/12    I will reach out to patient once a determination on her PAP benefits has been made. At that time we will determine the need for scheduled follow-up. She was provided with my contact information and encouraged to reach out with any questions and/or concerns.

## 2025-02-17 DIAGNOSIS — E11.65 CONTROLLED TYPE 2 DIABETES MELLITUS WITH HYPERGLYCEMIA, WITHOUT LONG-TERM CURRENT USE OF INSULIN: ICD-10-CM

## 2025-02-17 RX ORDER — TIRZEPATIDE 10 MG/.5ML
INJECTION, SOLUTION SUBCUTANEOUS
Qty: 2 ML | Refills: 0 | Status: SHIPPED | OUTPATIENT
Start: 2025-02-17

## 2025-03-24 DIAGNOSIS — E11.65 CONTROLLED TYPE 2 DIABETES MELLITUS WITH HYPERGLYCEMIA, WITHOUT LONG-TERM CURRENT USE OF INSULIN: ICD-10-CM

## 2025-03-24 RX ORDER — TIRZEPATIDE 10 MG/.5ML
10 INJECTION, SOLUTION SUBCUTANEOUS WEEKLY
Qty: 2 ML | Refills: 0 | Status: SHIPPED | OUTPATIENT
Start: 2025-03-24

## 2025-03-24 NOTE — TELEPHONE ENCOUNTER
Patient needs a refill (or new prescription) for Monjaro. 1 month supply. Working with pharmacy team, so just needs 1 mo supply.  Pharmacy: [facility] [place]. Please advise of any delays.

## 2025-04-02 DIAGNOSIS — E11.65 CONTROLLED TYPE 2 DIABETES MELLITUS WITH HYPERGLYCEMIA, WITHOUT LONG-TERM CURRENT USE OF INSULIN: ICD-10-CM

## 2025-04-02 RX ORDER — TIRZEPATIDE 10 MG/.5ML
10 INJECTION, SOLUTION SUBCUTANEOUS WEEKLY
Qty: 2 ML | Refills: 0 | Status: SHIPPED | OUTPATIENT
Start: 2025-04-02

## 2025-04-13 PROCEDURE — RXMED WILLOW AMBULATORY MEDICATION CHARGE

## 2025-04-15 ENCOUNTER — PHARMACY VISIT (OUTPATIENT)
Dept: PHARMACY | Facility: CLINIC | Age: 65
End: 2025-04-15
Payer: COMMERCIAL

## 2025-05-06 DIAGNOSIS — E53.8 B12 DEFICIENCY: ICD-10-CM

## 2025-05-06 DIAGNOSIS — E55.9 VITAMIN D DEFICIENCY: ICD-10-CM

## 2025-05-06 DIAGNOSIS — E11.65 CONTROLLED TYPE 2 DIABETES MELLITUS WITH HYPERGLYCEMIA, WITHOUT LONG-TERM CURRENT USE OF INSULIN: ICD-10-CM

## 2025-05-14 ENCOUNTER — APPOINTMENT (OUTPATIENT)
Dept: RADIOLOGY | Facility: CLINIC | Age: 65
End: 2025-05-14

## 2025-05-30 ENCOUNTER — PHARMACY VISIT (OUTPATIENT)
Dept: PHARMACY | Facility: CLINIC | Age: 65
End: 2025-05-30
Payer: COMMERCIAL

## 2025-05-30 ENCOUNTER — TELEPHONE (OUTPATIENT)
Dept: PHARMACY | Facility: HOSPITAL | Age: 65
End: 2025-05-30
Payer: COMMERCIAL

## 2025-05-30 DIAGNOSIS — E11.65 CONTROLLED TYPE 2 DIABETES MELLITUS WITH HYPERGLYCEMIA, WITHOUT LONG-TERM CURRENT USE OF INSULIN: ICD-10-CM

## 2025-05-30 PROCEDURE — RXMED WILLOW AMBULATORY MEDICATION CHARGE

## 2025-05-30 RX ORDER — TIRZEPATIDE 10 MG/.5ML
10 INJECTION, SOLUTION SUBCUTANEOUS WEEKLY
Qty: 2 ML | Refills: 3 | Status: SHIPPED | OUTPATIENT
Start: 2025-05-30

## 2025-05-30 NOTE — TELEPHONE ENCOUNTER
Patient reached out to let me know she is out of refills on her Mounjaro and needs a dose for this weekend. Updated prescription submitted to [facility] as they are also able to process medications through the  Patient Assistance Program now. She will be able to  either this afternoon or tomorrow morning.     She stated that she may discuss her dose with Dr. Odonnell at next follow-up, as diabetes is currently controlled at current dose. She was encouraged to let me know if we need to meet for dose adjustments or if an updated prescription needs sent in.     Please reach out with any questions and/or concerns.     Thank you,     Navarro Allen, PharmD

## 2025-06-07 LAB
25(OH)D3+25(OH)D2 SERPL-MCNC: 29 NG/ML (ref 30–100)
ALBUMIN SERPL-MCNC: 4.2 G/DL (ref 3.6–5.1)
ALP SERPL-CCNC: 57 U/L (ref 37–153)
ALT SERPL-CCNC: 14 U/L (ref 6–29)
ANION GAP SERPL CALCULATED.4IONS-SCNC: 8 MMOL/L (CALC) (ref 7–17)
AST SERPL-CCNC: 15 U/L (ref 10–35)
BILIRUB SERPL-MCNC: 0.7 MG/DL (ref 0.2–1.2)
BUN SERPL-MCNC: 17 MG/DL (ref 7–25)
CALCIUM SERPL-MCNC: 9.1 MG/DL (ref 8.6–10.4)
CHLORIDE SERPL-SCNC: 104 MMOL/L (ref 98–110)
CHOLEST SERPL-MCNC: 216 MG/DL
CHOLEST/HDLC SERPL: 4.9 (CALC)
CO2 SERPL-SCNC: 27 MMOL/L (ref 20–32)
CREAT SERPL-MCNC: 1.09 MG/DL (ref 0.5–1.05)
EGFRCR SERPLBLD CKD-EPI 2021: 56 ML/MIN/1.73M2
ERYTHROCYTE [DISTWIDTH] IN BLOOD BY AUTOMATED COUNT: 12.6 % (ref 11–15)
EST. AVERAGE GLUCOSE BLD GHB EST-MCNC: 111 MG/DL
EST. AVERAGE GLUCOSE BLD GHB EST-SCNC: 6.2 MMOL/L
GLUCOSE SERPL-MCNC: 107 MG/DL (ref 65–99)
HBA1C MFR BLD: 5.5 %
HCT VFR BLD AUTO: 46 % (ref 35–45)
HDLC SERPL-MCNC: 44 MG/DL
HGB BLD-MCNC: 14.8 G/DL (ref 11.7–15.5)
LDLC SERPL CALC-MCNC: 145 MG/DL (CALC)
MCH RBC QN AUTO: 30 PG (ref 27–33)
MCHC RBC AUTO-ENTMCNC: 32.2 G/DL (ref 32–36)
MCV RBC AUTO: 93.3 FL (ref 80–100)
NONHDLC SERPL-MCNC: 172 MG/DL (CALC)
PLATELET # BLD AUTO: 212 THOUSAND/UL (ref 140–400)
PMV BLD REES-ECKER: 10.1 FL (ref 7.5–12.5)
POTASSIUM SERPL-SCNC: 4.8 MMOL/L (ref 3.5–5.3)
PROT SERPL-MCNC: 6.7 G/DL (ref 6.1–8.1)
RBC # BLD AUTO: 4.93 MILLION/UL (ref 3.8–5.1)
SODIUM SERPL-SCNC: 139 MMOL/L (ref 135–146)
TRIGL SERPL-MCNC: 142 MG/DL
VIT B12 SERPL-MCNC: 440 PG/ML (ref 200–1100)
WBC # BLD AUTO: 5.7 THOUSAND/UL (ref 3.8–10.8)

## 2025-06-10 ENCOUNTER — APPOINTMENT (OUTPATIENT)
Dept: PRIMARY CARE | Facility: CLINIC | Age: 65
End: 2025-06-10
Payer: COMMERCIAL

## 2025-06-10 VITALS
DIASTOLIC BLOOD PRESSURE: 74 MMHG | HEART RATE: 65 BPM | SYSTOLIC BLOOD PRESSURE: 138 MMHG | OXYGEN SATURATION: 97 % | HEIGHT: 66 IN | WEIGHT: 234.4 LBS | BODY MASS INDEX: 37.67 KG/M2

## 2025-06-10 DIAGNOSIS — N18.31 STAGE 3A CHRONIC KIDNEY DISEASE (MULTI): Primary | ICD-10-CM

## 2025-06-10 DIAGNOSIS — N18.31 TYPE 2 DIABETES MELLITUS WITH STAGE 3A CHRONIC KIDNEY DISEASE, WITHOUT LONG-TERM CURRENT USE OF INSULIN (MULTI): ICD-10-CM

## 2025-06-10 DIAGNOSIS — K21.9 GASTROESOPHAGEAL REFLUX DISEASE, UNSPECIFIED WHETHER ESOPHAGITIS PRESENT: ICD-10-CM

## 2025-06-10 DIAGNOSIS — E78.5 HYPERLIPIDEMIA, MILD: ICD-10-CM

## 2025-06-10 DIAGNOSIS — I10 BENIGN ESSENTIAL HYPERTENSION: ICD-10-CM

## 2025-06-10 DIAGNOSIS — E55.9 VITAMIN D DEFICIENCY: ICD-10-CM

## 2025-06-10 DIAGNOSIS — E11.65 CONTROLLED TYPE 2 DIABETES MELLITUS WITH HYPERGLYCEMIA, WITHOUT LONG-TERM CURRENT USE OF INSULIN: ICD-10-CM

## 2025-06-10 DIAGNOSIS — E11.22 TYPE 2 DIABETES MELLITUS WITH STAGE 3A CHRONIC KIDNEY DISEASE, WITHOUT LONG-TERM CURRENT USE OF INSULIN (MULTI): ICD-10-CM

## 2025-06-10 PROCEDURE — 1036F TOBACCO NON-USER: CPT | Performed by: INTERNAL MEDICINE

## 2025-06-10 PROCEDURE — 99214 OFFICE O/P EST MOD 30 MIN: CPT | Performed by: INTERNAL MEDICINE

## 2025-06-10 PROCEDURE — 1160F RVW MEDS BY RX/DR IN RCRD: CPT | Performed by: INTERNAL MEDICINE

## 2025-06-10 PROCEDURE — 1159F MED LIST DOCD IN RCRD: CPT | Performed by: INTERNAL MEDICINE

## 2025-06-10 PROCEDURE — 3078F DIAST BP <80 MM HG: CPT | Performed by: INTERNAL MEDICINE

## 2025-06-10 PROCEDURE — G2211 COMPLEX E/M VISIT ADD ON: HCPCS | Performed by: INTERNAL MEDICINE

## 2025-06-10 PROCEDURE — 3008F BODY MASS INDEX DOCD: CPT | Performed by: INTERNAL MEDICINE

## 2025-06-10 PROCEDURE — 3075F SYST BP GE 130 - 139MM HG: CPT | Performed by: INTERNAL MEDICINE

## 2025-06-10 PROCEDURE — RXMED WILLOW AMBULATORY MEDICATION CHARGE

## 2025-06-10 RX ORDER — EZETIMIBE 10 MG/1
10 TABLET ORAL DAILY
Qty: 90 TABLET | Refills: 1 | Status: SHIPPED | OUTPATIENT
Start: 2025-06-10 | End: 2026-06-10

## 2025-06-10 RX ORDER — TIRZEPATIDE 12.5 MG/.5ML
12.5 INJECTION, SOLUTION SUBCUTANEOUS WEEKLY
Qty: 2 ML | Refills: 0 | Status: SHIPPED | OUTPATIENT
Start: 2025-06-10

## 2025-06-10 NOTE — PROGRESS NOTES
"Subjective   Patient ID: Darlin Ryan is a 65 y.o. female who presents for 4 month follow up .    HPI     Here for follow-up  Has a few concerns  Did her labs  Has had some mid back pain on right side when sleeping and when lays on right side and tries to roll over    Tried 2 tabs of tizanidine for her back from a friend and really helped    Missed the calcium score test    Bp has been a little higher at visits. Does not have a cuff    Has some worsening heart burn. Does an héctor seltzer and  tums at night      Review of Systems   All other systems reviewed and are negative.      Objective   /74 (BP Location: Right arm, Patient Position: Sitting, BP Cuff Size: Adult)   Pulse 65   Ht 1.676 m (5' 6\")   Wt 106 kg (234 lb 6.4 oz)   SpO2 97%   BMI 37.83 kg/m²     Physical Exam  Constitutional:       Appearance: Normal appearance.   Cardiovascular:      Rate and Rhythm: Normal rate and regular rhythm.      Heart sounds: Normal heart sounds. No murmur heard.     No gallop.   Pulmonary:      Effort: Pulmonary effort is normal. No respiratory distress.      Breath sounds: Normal breath sounds.   Musculoskeletal:      Right lower leg: No edema.      Left lower leg: No edema.   Neurological:      Mental Status: She is alert.         Assessment/Plan   Problem List Items Addressed This Visit           ICD-10-CM    Benign essential hypertension I10    Type 2 diabetes mellitus with stage 3a chronic kidney disease, without long-term current use of insulin (Multi) E11.22, N18.31    Relevant Medications    tirzepatide (Mounjaro) 12.5 mg/0.5 mL pen injector    Hyperlipidemia, mild (Chronic) E78.5    Relevant Medications    ezetimibe (Zetia) 10 mg tablet    Other Relevant Orders    Lipid Panel    Stage 3a chronic kidney disease (Multi) - Primary N18.31    Relevant Orders    Basic Metabolic Panel    Vitamin D deficiency E55.9    Gastroesophageal reflux disease K21.9         Back/SI pain  -s/p RFA with Dr. Turner      " Obesity:  -topamax did not help  -Phentermine helped. Down 20 pounds.   -mounjaro  -did not have an affect with ozempic     Muscle aches: sounds like from statin  -discussed zetia but wants to try diet changes     TMJ issues:      Type 2 diabetes: a1c: 11.3--> 6.3--> 6.4--> 5.6-->6.1--> 5.3--> 5.4--> 5.8-->6.1--> 6.6--> 5.6--> 5.0--> 5.4  -off metformin  -on Mounjaro--can titrate for weight  -+ urine microalbumin (1/20)--> now normal 10/22     Migraines from metformin: off topamax     Hypertension: resolved off of meds  -Lisinopril caused migraines  -up--get cuff and tracj     Proteinuria: resolved     Leukopenia: resolved     Hyperlipidemia: uncontrolled  -crestor and pravastatin caused muscle aches  -start zetia  -recheck before visit     Shoulder pain and neck pain: seeing ortho  -has a tear     Known Fatty liver:   -weight loss-congratulated  -liver enzymes normalized     Depression  -follows with Dr. Hilda Lockwood ([place])--has not seen  -controlled on celexa and desyrel and prn vistaril  -if stable, I can prescribe. If worsens back to psychiatry     Chronic back pain: herniated disc, DDD, and sciatica  -she has had injections  -can use salon past patches, voltaren gel  -seeing pain mgmt  -did injection  -on lyrica  -to have SI joint injection     Hx of lichen planus: follows with dermatology--Dr. Drake     Venous insufficiency: compression stockings, elevate legs     B12 deficiency: cont b12 pills    Back pain -trial tizanidine, she will call with the dose      4 months f/u with labs before     Health Maintenance:   -pap smear: 10/20- neg, repeat 5 years  -Mammogram: refused mammo. Interested in MRI maybe in future--declines today  -Colonoscopy: 10/26/22- cologuard neg  -Lung Cancer Screening: quit 2004  -Vaccinations: Pneumovax UTD. advised shingrix and tdap. Flu UTD. Prevnar-20  -DEXA: at 64

## 2025-06-10 NOTE — PATIENT INSTRUCTIONS
Start vitamin D 2000 international units once a day    Pepcid once a day for heartburn    Zetia once a day    Fasting labs before visit

## 2025-06-18 ENCOUNTER — PHARMACY VISIT (OUTPATIENT)
Dept: PHARMACY | Facility: CLINIC | Age: 65
End: 2025-06-18
Payer: COMMERCIAL

## 2025-07-03 ENCOUNTER — OFFICE VISIT (OUTPATIENT)
Dept: PRIMARY CARE | Facility: CLINIC | Age: 65
End: 2025-07-03
Payer: MEDICARE

## 2025-07-03 VITALS
WEIGHT: 233.6 LBS | OXYGEN SATURATION: 97 % | DIASTOLIC BLOOD PRESSURE: 76 MMHG | SYSTOLIC BLOOD PRESSURE: 126 MMHG | HEIGHT: 66 IN | BODY MASS INDEX: 37.54 KG/M2 | HEART RATE: 64 BPM

## 2025-07-03 DIAGNOSIS — M77.32 CALCANEAL SPUR OF LEFT FOOT: Primary | ICD-10-CM

## 2025-07-03 DIAGNOSIS — K21.9 GASTROESOPHAGEAL REFLUX DISEASE WITHOUT ESOPHAGITIS: ICD-10-CM

## 2025-07-03 PROCEDURE — 99213 OFFICE O/P EST LOW 20 MIN: CPT | Performed by: INTERNAL MEDICINE

## 2025-07-03 PROCEDURE — 1160F RVW MEDS BY RX/DR IN RCRD: CPT | Performed by: INTERNAL MEDICINE

## 2025-07-03 PROCEDURE — 1159F MED LIST DOCD IN RCRD: CPT | Performed by: INTERNAL MEDICINE

## 2025-07-03 PROCEDURE — 3078F DIAST BP <80 MM HG: CPT | Performed by: INTERNAL MEDICINE

## 2025-07-03 PROCEDURE — 3074F SYST BP LT 130 MM HG: CPT | Performed by: INTERNAL MEDICINE

## 2025-07-03 PROCEDURE — G2211 COMPLEX E/M VISIT ADD ON: HCPCS | Performed by: INTERNAL MEDICINE

## 2025-07-03 PROCEDURE — 3008F BODY MASS INDEX DOCD: CPT | Performed by: INTERNAL MEDICINE

## 2025-07-03 PROCEDURE — 1036F TOBACCO NON-USER: CPT | Performed by: INTERNAL MEDICINE

## 2025-07-03 RX ORDER — METHYLPREDNISOLONE 4 MG/1
TABLET ORAL
Qty: 21 TABLET | Refills: 0 | Status: SHIPPED | OUTPATIENT
Start: 2025-07-03 | End: 2025-07-09

## 2025-07-03 RX ORDER — OMEPRAZOLE 20 MG/1
20 CAPSULE, DELAYED RELEASE ORAL DAILY
Qty: 90 CAPSULE | Refills: 1 | Status: SHIPPED | OUTPATIENT
Start: 2025-07-03 | End: 2025-12-30

## 2025-07-03 ASSESSMENT — PATIENT HEALTH QUESTIONNAIRE - PHQ9
1. LITTLE INTEREST OR PLEASURE IN DOING THINGS: NOT AT ALL
SUM OF ALL RESPONSES TO PHQ9 QUESTIONS 1 AND 2: 0
2. FEELING DOWN, DEPRESSED OR HOPELESS: NOT AT ALL

## 2025-07-03 NOTE — PROGRESS NOTES
"Subjective   Patient ID: Darlin Ryan is a 65 y.o. female who presents for Foot Swelling (Bump on left heel, causing pain on tendon. Started about two weeks ago. ).    HPI     She has had 2 weeks of pain on heel along tendon. Has a bump  No injury  Never had before  Does not hurt unless watching  Feels the tendon pulling      Prilosec has helped stomach  Review of Systems    Objective   /76 (BP Location: Right arm, Patient Position: Sitting, BP Cuff Size: Adult)   Pulse 64   Ht 1.676 m (5' 5.98\")   Wt 106 kg (233 lb 9.6 oz) Comment: with tennis shoes  SpO2 97%   BMI 37.72 kg/m²     Physical Exam  Musculoskeletal:        Feet:    Feet:      Comments: Spur noted on left foot  No erythema, warmth or swelling  Ttp along spur        Assessment/Plan   Problem List Items Addressed This Visit           ICD-10-CM    Gastroesophageal reflux disease K21.9    Relevant Medications    omeprazole (PriLOSEC) 20 mg DR capsule     Other Visit Diagnoses         Codes      Calcaneal spur of left foot    -  Primary M77.32    Relevant Medications    methylPREDNISolone (Medrol Dospak) 4 mg tablets    Other Relevant Orders    Referral to Podiatry             Spur  -medrol dose carmen  Send to podiatry    Vitamin D 5000 international units once per day    Cont prilosec prn   "

## 2025-07-16 ENCOUNTER — APPOINTMENT (OUTPATIENT)
Dept: PODIATRY | Facility: CLINIC | Age: 65
End: 2025-07-16
Payer: COMMERCIAL

## 2025-07-21 ENCOUNTER — PATIENT MESSAGE (OUTPATIENT)
Dept: PRIMARY CARE | Facility: CLINIC | Age: 65
End: 2025-07-21
Payer: COMMERCIAL

## 2025-07-21 DIAGNOSIS — E11.65 CONTROLLED TYPE 2 DIABETES MELLITUS WITH HYPERGLYCEMIA, WITHOUT LONG-TERM CURRENT USE OF INSULIN: Primary | ICD-10-CM

## 2025-07-21 DIAGNOSIS — E11.65 CONTROLLED TYPE 2 DIABETES MELLITUS WITH HYPERGLYCEMIA, WITHOUT LONG-TERM CURRENT USE OF INSULIN: ICD-10-CM

## 2025-07-21 RX ORDER — TIRZEPATIDE 12.5 MG/.5ML
12.5 INJECTION, SOLUTION SUBCUTANEOUS WEEKLY
Qty: 2 ML | Refills: 3 | Status: SHIPPED | OUTPATIENT
Start: 2025-07-21 | End: 2025-07-21 | Stop reason: ALTCHOICE

## 2025-07-21 RX ORDER — TIRZEPATIDE 15 MG/.5ML
15 INJECTION, SOLUTION SUBCUTANEOUS WEEKLY
Qty: 2 ML | Refills: 3 | Status: SHIPPED | OUTPATIENT
Start: 2025-07-21

## 2025-07-24 ENCOUNTER — PHARMACY VISIT (OUTPATIENT)
Dept: PHARMACY | Facility: CLINIC | Age: 65
End: 2025-07-24
Payer: COMMERCIAL

## 2025-07-24 PROCEDURE — RXMED WILLOW AMBULATORY MEDICATION CHARGE

## 2025-08-19 DIAGNOSIS — Z12.31 ENCOUNTER FOR SCREENING MAMMOGRAM FOR BREAST CANCER: ICD-10-CM

## 2025-08-20 PROCEDURE — RXMED WILLOW AMBULATORY MEDICATION CHARGE

## 2025-08-21 ENCOUNTER — APPOINTMENT (OUTPATIENT)
Dept: PODIATRY | Facility: CLINIC | Age: 65
End: 2025-08-21
Payer: MEDICARE

## 2025-08-21 DIAGNOSIS — M77.32 HEEL SPUR, LEFT: ICD-10-CM

## 2025-08-21 DIAGNOSIS — M76.62 ACHILLES TENDINITIS OF LEFT LOWER EXTREMITY: Primary | ICD-10-CM

## 2025-08-21 PROCEDURE — 99203 OFFICE O/P NEW LOW 30 MIN: CPT | Performed by: PODIATRIST

## 2025-08-21 PROCEDURE — 1159F MED LIST DOCD IN RCRD: CPT | Performed by: PODIATRIST

## 2025-08-21 PROCEDURE — 1160F RVW MEDS BY RX/DR IN RCRD: CPT | Performed by: PODIATRIST

## 2025-08-22 ENCOUNTER — PHARMACY VISIT (OUTPATIENT)
Dept: PHARMACY | Facility: CLINIC | Age: 65
End: 2025-08-22
Payer: COMMERCIAL

## 2025-09-07 DIAGNOSIS — F32.5 MAJOR DEPRESSIVE DISORDER WITH SINGLE EPISODE, IN FULL REMISSION: ICD-10-CM

## 2025-09-07 RX ORDER — CITALOPRAM 40 MG/1
40 TABLET ORAL DAILY
Qty: 90 TABLET | Refills: 1 | Status: SHIPPED | OUTPATIENT
Start: 2025-09-07 | End: 2025-12-06

## 2025-10-27 ENCOUNTER — APPOINTMENT (OUTPATIENT)
Dept: PRIMARY CARE | Facility: CLINIC | Age: 65
End: 2025-10-27
Payer: COMMERCIAL

## 2025-10-28 ENCOUNTER — APPOINTMENT (OUTPATIENT)
Dept: PODIATRY | Facility: CLINIC | Age: 65
End: 2025-10-28
Payer: COMMERCIAL